# Patient Record
Sex: FEMALE | Race: BLACK OR AFRICAN AMERICAN | NOT HISPANIC OR LATINO | Employment: FULL TIME | URBAN - METROPOLITAN AREA
[De-identification: names, ages, dates, MRNs, and addresses within clinical notes are randomized per-mention and may not be internally consistent; named-entity substitution may affect disease eponyms.]

---

## 2017-01-03 ENCOUNTER — DOCUMENTATION ONLY (OUTPATIENT)
Dept: ORTHOPEDICS | Facility: CLINIC | Age: 47
End: 2017-01-03

## 2017-01-03 NOTE — PROGRESS NOTES
"Patient currently scheduled with FALGUNI Taveras. Appointment with Rosalia Youngblood notated with "shoulder fracture", per message. Appointment scheduled and message sent on the same day.  "

## 2017-01-06 ENCOUNTER — OFFICE VISIT (OUTPATIENT)
Dept: ORTHOPEDICS | Facility: CLINIC | Age: 47
End: 2017-01-06
Payer: COMMERCIAL

## 2017-01-06 ENCOUNTER — HOSPITAL ENCOUNTER (OUTPATIENT)
Dept: RADIOLOGY | Facility: HOSPITAL | Age: 47
Discharge: HOME | End: 2017-01-06
Attending: ORTHOPAEDIC SURGERY
Payer: COMMERCIAL

## 2017-01-06 VITALS
HEART RATE: 81 BPM | DIASTOLIC BLOOD PRESSURE: 82 MMHG | WEIGHT: 243.38 LBS | HEIGHT: 65 IN | BODY MASS INDEX: 40.55 KG/M2 | SYSTOLIC BLOOD PRESSURE: 120 MMHG

## 2017-01-06 DIAGNOSIS — M25.572 LEFT ANKLE PAIN, UNSPECIFIED CHRONICITY: ICD-10-CM

## 2017-01-06 DIAGNOSIS — M25.572 LEFT ANKLE PAIN, UNSPECIFIED CHRONICITY: Primary | ICD-10-CM

## 2017-01-06 PROCEDURE — 73610 X-RAY EXAM OF ANKLE: CPT | Mod: TC,LT

## 2017-01-06 PROCEDURE — 99203 OFFICE O/P NEW LOW 30 MIN: CPT | Mod: S$PBB,,, | Performed by: PHYSICIAN ASSISTANT

## 2017-01-06 PROCEDURE — 99999 PR PBB SHADOW E&M-EST. PATIENT-LVL III: CPT | Mod: PBBFAC,,, | Performed by: PHYSICIAN ASSISTANT

## 2017-01-06 PROCEDURE — 73610 X-RAY EXAM OF ANKLE: CPT | Mod: 26,LT,, | Performed by: RADIOLOGY

## 2017-01-06 PROCEDURE — 99213 OFFICE O/P EST LOW 20 MIN: CPT | Mod: PBBFAC | Performed by: PHYSICIAN ASSISTANT

## 2017-01-06 RX ORDER — MELOXICAM 15 MG/1
15 TABLET ORAL DAILY
Qty: 30 TABLET | Refills: 3 | Status: SHIPPED | OUTPATIENT
Start: 2017-01-06 | End: 2017-02-05

## 2017-01-06 NOTE — MR AVS SNAPSHOT
[facility]  [address]  [facility] [address]  Phone: [phone]                  Belkys Cote   2017 3:15 PM   Appointment    Description:  Female : 1970   Provider:  Rosalia Youngblood PA-C   Department:  [facility]                To Do List           Future Appointments        Provider Department Dept Phone    2017 3:15 PM Rosalia Youngblood PA-C [facility] [phone]      Goals (5 Years of Data)     None      [facility]     [facility] On Call Nurse Care Line -  Assistance  Registered nurses in the [facility] On Call Center provide clinical advisement, health education, appointment booking, and other advisory services.  Call for this free service at [phone].             Medications           Message regarding Medications     Verify the changes and/or additions to your medication regime listed below are the same as discussed with your clinician today.  If any of these changes or additions are incorrect, please notify your healthcare provider.             Verify that the below list of medications is an accurate representation of the medications you are currently taking.  If none reported, the list may be blank. If incorrect, please contact your healthcare provider. Carry this list with you in case of emergency.                Clinical Reference Information           Allergies as of 2017     Not on File      Immunizations Administered on Date of Encounter - 2017     None      [facility] Sign-Up     Activating your [facility] account is as easy as 1-2-3!     1) Visit [email or web address], select Sign Up Now, enter this activation code and your date of birth, then select Next.  [email or web address]  Expires: 2017  7:45 AM      2) Create a username and password to use when you visit [facility] in the future and select a security question in case you lose your password and select Next.    3) Enter your e-mail address and click Sign  Up!    Additional Information  If you have questions, please e-mail [email or web address] or call [phone] to talk to our [facility] staff. Remember, [facility] is NOT to be used for urgent needs. For medical emergencies, dial 911.

## 2017-01-06 NOTE — PROGRESS NOTES
Subjective:      Patient ID: Belkys Cote is a 46 y.o. female.    Chief Complaint: No chief complaint on file.    HPI  46 year old female presents with chief complaint of left ankle pain. She fractured her ankle in December 2015 and had ORIF in January 2016 by Dr. Alvarez at [place] in [place]. She reports intermittent pain since this injury and surgery. She did PT. Pain is diffuse around the ankle. It is worse with cold, rainy weather and in the mornings. She also has pain with walking. She doesn't take any medicine for the pain. She has intermittent swelling. She denies N/T. She wears a compression sleeve 75% of the time and she wears inserts.   Review of Systems   Constitution: Negative for chills, fever and night sweats.   Cardiovascular: Negative for chest pain.   Respiratory: Negative for cough and shortness of breath.    Hematologic/Lymphatic: Does not bruise/bleed easily.   Skin: Negative for color change.   Gastrointestinal: Negative for heartburn.   Genitourinary: Negative for dysuria.   Neurological: Negative for numbness and paresthesias.   Psychiatric/Behavioral: Negative for altered mental status.   Allergic/Immunologic: Negative for persistent infections.         Objective:            General    Vitals reviewed.  Constitutional: She is oriented to person, place, and time. She appears well-developed and well-nourished.   Cardiovascular: Normal rate.    Neurological: She is alert and oriented to person, place, and time.         Left Ankle/Foot Exam     Inspection  Erythema: absent    Range of Motion   The patient has normal left ankle ROM.     Muscle Strength   The patient has normal left ankle strength.    Tests   Anterior drawer: negative  Varus tilt: negative  Single Heel Rise: unable to perform    Other   Sensation: normal    Comments:  Pes planus. TTP medial ankle and anterior joint line. No lateral tenderness.       Vascular Exam       Left Pulses  Dorsalis Pedis:      2+              X-ray:  ordered and reviewed by myself. Skeletal structures are intact without acute fracture. Fixation plate is present at the distal fibula presumably for an old fracture that has healed. Alignment and joint spaces are satisfactory of the ankle mortise without significant arthritis. No focal soft tissue swelling is seen.         Assessment:       Encounter Diagnosis   Name Primary?    Left ankle pain, unspecified chronicity Yes          Plan:       Patient was placed in a boot. Prescription for mobic sent to pharmacy. Continue inserts. RTC in 4 weeks for re-evaluation.

## 2017-02-03 ENCOUNTER — OFFICE VISIT (OUTPATIENT)
Dept: ORTHOPEDICS | Facility: CLINIC | Age: 47
End: 2017-02-03
Payer: COMMERCIAL

## 2017-02-03 VITALS — SYSTOLIC BLOOD PRESSURE: 113 MMHG | HEART RATE: 81 BPM | DIASTOLIC BLOOD PRESSURE: 82 MMHG

## 2017-02-03 DIAGNOSIS — M25.572 CHRONIC PAIN OF LEFT ANKLE: Primary | ICD-10-CM

## 2017-02-03 DIAGNOSIS — G89.29 CHRONIC PAIN OF LEFT ANKLE: Primary | ICD-10-CM

## 2017-02-03 PROCEDURE — 99213 OFFICE O/P EST LOW 20 MIN: CPT | Mod: PBBFAC | Performed by: PHYSICIAN ASSISTANT

## 2017-02-03 PROCEDURE — 99999 PR PBB SHADOW E&M-EST. PATIENT-LVL III: CPT | Mod: PBBFAC,,, | Performed by: PHYSICIAN ASSISTANT

## 2017-02-03 PROCEDURE — 99213 OFFICE O/P EST LOW 20 MIN: CPT | Mod: S$PBB,,, | Performed by: PHYSICIAN ASSISTANT

## 2017-02-03 NOTE — MR AVS SNAPSHOT
[facility]  [address]  [facility] [address]  Phone: [phone]                  Belkys Cote   2/3/2017 10:00 AM   Appointment    Description:  Female : 1970   Provider:  Rosalia Youngblood PA-C   Department:  [facility]                To Do List           Future Appointments        Provider Department Dept Phone    2/3/2017 10:00 AM Rosalia Youngblood PA-C [facility] [phone]      Goals (5 Years of Data)     None      [facility]     [facility] On [facility] Nurse Care Line -  Assistance  Registered nurses in the [facility] On Call Center provide clinical advisement, health education, appointment booking, and other advisory services.  Call for this free service at [phone].             Medications           Message regarding Medications     Verify the changes and/or additions to your medication regime listed below are the same as discussed with your clinician today.  If any of these changes or additions are incorrect, please notify your healthcare provider.             Verify that the below list of medications is an accurate representation of the medications you are currently taking.  If none reported, the list may be blank. If incorrect, please contact your healthcare provider. Carry this list with you in case of emergency.           Current Medications     meloxicam (MOBIC) 15 MG tablet Take 1 tablet (15 mg total) by mouth once daily.           Clinical Reference Information           Allergies as of 2/3/2017     Betadine [Povidone-iodine]    Chlorhexidine      Immunizations Administered on Date of Encounter - 2/3/2017     None      Language Assistance Services     ATTENTION: Language assistance services are available, free of charge. Please call [phone].      ATENCIÓN: Si habla christinaañol, tiene a farris disposición servicios gratuitos de asistencia lingüística. Llame al [phone].     CHÚ Ý: N?u b?n nói Ti?ng Vi?t, có các d?ch v? h?  tr? roman diego? mi?n phí dành cho b?n. G?i s? [phone].         [facility] complies with applicable Federal civil rights laws and does not discriminate on the basis of race, color, national origin, age, disability, or sex.

## 2017-02-03 NOTE — PROGRESS NOTES
Subjective:      Patient ID: Belkys Cote is a 46 y.o. female.    Chief Complaint: No chief complaint on file.    HPI  Patient returns to clinic for f/u of her chronic left ankle pain. She has been WBAT in the boot with inserts for the past 4 weeks. She reports 6/10 pain. She says it hasn't gotten better since last visit. Pain is at the medial ankle. It hurts more without the boot. Mobic does not help.   Review of Systems   Constitution: Negative for chills, fever and night sweats.   Cardiovascular: Negative for chest pain.   Respiratory: Negative for cough and shortness of breath.    Hematologic/Lymphatic: Does not bruise/bleed easily.   Skin: Negative for color change.   Gastrointestinal: Negative for heartburn.   Genitourinary: Negative for dysuria.   Neurological: Negative for numbness and paresthesias.   Psychiatric/Behavioral: Negative for altered mental status.   Allergic/Immunologic: Negative for persistent infections.         Objective:            General    Vitals reviewed.  Constitutional: She is oriented to person, place, and time. She appears well-developed and well-nourished.   Cardiovascular: Normal rate.    Neurological: She is alert and oriented to person, place, and time.         Left Ankle/Foot Exam     Inspection  Erythema: absent    Range of Motion   The patient has normal left ankle ROM.     Tests   Single Heel Rise: unable to perform    Other   Sensation: normal    Comments:  TTP PTT and medial talar dome.       Vascular Exam       Left Pulses  Dorsalis Pedis:      2+                      Assessment:       Encounter Diagnosis   Name Primary?    Chronic pain of left ankle Yes          Plan:       Ankle pain has not improved since last visit. MRI was ordered with metal reduction. She will f/u with Dr. Spivey after testing is done.

## 2017-02-13 ENCOUNTER — HOSPITAL ENCOUNTER (OUTPATIENT)
Dept: RADIOLOGY | Facility: HOSPITAL | Age: 47
Discharge: HOME | End: 2017-02-13
Attending: ORTHOPAEDIC SURGERY
Payer: COMMERCIAL

## 2017-02-13 DIAGNOSIS — G89.29 CHRONIC PAIN OF LEFT ANKLE: ICD-10-CM

## 2017-02-13 DIAGNOSIS — M25.572 CHRONIC PAIN OF LEFT ANKLE: ICD-10-CM

## 2017-02-13 PROCEDURE — 73721 MRI JNT OF LWR EXTRE W/O DYE: CPT | Mod: 26,LT,, | Performed by: RADIOLOGY

## 2017-02-13 PROCEDURE — 73721 MRI JNT OF LWR EXTRE W/O DYE: CPT | Mod: TC,LT

## 2017-02-20 ENCOUNTER — OFFICE VISIT (OUTPATIENT)
Dept: ORTHOPEDICS | Facility: CLINIC | Age: 47
End: 2017-02-20
Payer: COMMERCIAL

## 2017-02-20 VITALS
DIASTOLIC BLOOD PRESSURE: 72 MMHG | HEIGHT: 65 IN | BODY MASS INDEX: 39.67 KG/M2 | SYSTOLIC BLOOD PRESSURE: 105 MMHG | WEIGHT: 238.13 LBS | HEART RATE: 78 BPM

## 2017-02-20 DIAGNOSIS — M72.2 PLANTAR FASCIITIS: ICD-10-CM

## 2017-02-20 DIAGNOSIS — M76.829 POSTERIOR TIBIAL TENDON DYSFUNCTION: ICD-10-CM

## 2017-02-20 DIAGNOSIS — M19.072 ARTHROSIS OF ANKLE, LEFT: Primary | ICD-10-CM

## 2017-02-20 PROCEDURE — 99213 OFFICE O/P EST LOW 20 MIN: CPT | Mod: S$PBB,,, | Performed by: ORTHOPAEDIC SURGERY

## 2017-02-20 PROCEDURE — 99999 PR PBB SHADOW E&M-EST. PATIENT-LVL III: CPT | Mod: PBBFAC,,, | Performed by: ORTHOPAEDIC SURGERY

## 2017-02-20 PROCEDURE — 99213 OFFICE O/P EST LOW 20 MIN: CPT | Mod: PBBFAC | Performed by: ORTHOPAEDIC SURGERY

## 2017-02-20 RX ORDER — TOPIRAMATE 25 MG/1
TABLET ORAL
COMMUNITY
End: 2017-02-20 | Stop reason: CLARIF

## 2017-02-20 RX ORDER — FEXOFENADINE HCL AND PSEUDOEPHEDRINE HCI 180; 240 MG/1; MG/1
1 TABLET, EXTENDED RELEASE ORAL DAILY
COMMUNITY

## 2017-02-20 RX ORDER — HYDROCODONE BITARTRATE AND ACETAMINOPHEN 5; 325 MG/1; MG/1
TABLET ORAL
COMMUNITY
Start: 2016-01-28

## 2017-02-20 RX ORDER — NAPROXEN SODIUM 220 MG/1
TABLET, FILM COATED ORAL
COMMUNITY

## 2017-02-20 RX ORDER — TOPIRAMATE 100 MG/1
25 TABLET, FILM COATED ORAL 2 TIMES DAILY
COMMUNITY

## 2017-02-20 RX ORDER — ONDANSETRON 4 MG/1
8 TABLET, ORALLY DISINTEGRATING ORAL
COMMUNITY

## 2017-02-20 NOTE — PROGRESS NOTES
DATE: 2/20/2017  PATIENT: Belkys Cote    CHIEF COMPLAINT: left foot pain    HISTORY:  Belkys Cote is a 46 y.o. female here for initial evaluation of her left foot pain.  She has h/o bilateral flat feet (which she feels have progressed), left plantar fasciitis, and left fibula fx s/p ORIF (12/2015 in [place], [place]); the PTTI and plantar fasciitis have been painful since prior to the fx.  Recently she has been having plantar pains in her left foot that increase with weight bearing and is most severe with first few steps in the morning.  She has tried boot immobilization and NSAIDs with nvumlb-fa-ad relief.  She was supposed to receive a plantar fascia night splint prior to leaving [place], however this was not obtained.  She also has bl flat feet, which she feels have gotten worse.      She has not lateral ankle pain or feelings of instability      PAST MEDICAL/SURGICAL HISTORY:  Past Medical History   Diagnosis Date    Allergy     Anxiety      Past Surgical History   Procedure Laterality Date    Cholecystectomy  01/2001    Hysterectomy  01/2000    Neck sugery  01/2008    Ankle sugery   01/2016       Current Medications:   Current Outpatient Prescriptions:     aspirin 81 MG Chew, Take 81 mg by mouth daily, Disp: , Rfl:     fexofenadine-pseudoephedrine (ALLEGRA-D 24) 180-240 mg per 24 hr tablet, Take 1 tablet by mouth once daily., Disp: , Rfl:     hydrocodone-acetaminophen 5-325mg (NORCO) 5-325 mg per tablet, Take 1-2 tablets by mouth every 6 (six) hours as needed  for Pain, Disp: , Rfl:     ondansetron (ZOFRAN-ODT) 4 MG TbDL, Take 8 mg by mouth every 12 (twelve) hours., Disp: , Rfl:     topiramate (TOPAMAX) 100 MG tablet, Take 100 mg by mouth 2 (two) times daily., Disp: , Rfl:     Social History:   Social History     Social History    Marital status: Unknown     Spouse name: N/A    Number of children: N/A    Years of education: N/A     Occupational History    Not on file.     Social History Main  "Topics    Smoking status: Never Smoker    Smokeless tobacco: Not on file    Alcohol use Yes    Drug use: No    Sexual activity: Not on file     Other Topics Concern    Not on file     Social History Narrative       REVIEW OF SYSTEMS:  Constitution: Negative. Negative for chills, fever and night sweats.   Cardiovascular: Negative for chest pain and syncope.   Respiratory: Negative for cough and shortness of breath.   Gastrointestinal: See HPI. Negative for nausea/vomiting. Negative for abdominal pain.  Genitourinary: See HPI. Negative for discoloration or dysuria.  Skin: Negative for dry skin, itching and rash.   Hematologic/Lymphatic: Negative for bleeding problem. Does not bruise/bleed easily.   Musculoskeletal: Negative for falls and muscle weakness.   Neurological: See HPI. No seizures.   Endocrine: Negative for polydipsia, polyphagia and polyuria.   Allergic/Immunologic: Negative for hives and persistent infections.    PHYSICAL EXAMINATION:    Visit Vitals    /72 (BP Location: Right arm, Patient Position: Sitting, BP Method: Automatic)    Pulse 78    Ht 5' 5" (1.651 m)    Wt 108 kg (238 lb 1.6 oz)    BMI 39.62 kg/m2       General: The patient is a 46 y.o. female in no apparent distress, the patient is orientatied to person, place and time.   Psych: Normal mood and affect  HEENT:  NCAT, sclera nonicteric  Lungs:  Respirations are equal and unlabored.  CV:  2+ bilateral upper and lower extremity pulses.  Skin:  Intact throughout.  Musculoskeletal: No pain with the range of motion of the bilateral hips. No trochanteric tenderness to palpation. No pain with range of motion about the bilateral knees.    Left Ankle/Foot:  - pes planus with slight valgus  - mild ttp to PT, medial ankle joint line, and plantar fascia  - full ROM without pain  - unable to perform single leg heel raise  - SILT  - 2+ DP/PT pulse        IMAGING:     Radiographs of the left ankle were personally reviewed with the patient " today.   - pes planus    MRI   - medical talar cartilage defect   - to inflammation of plantar fascia or PTT    ASSESSMENT/PLAN:    Belkys was seen today for foot pain.    Diagnoses and all orders for this visit:    Arthrosis of ankle, left    Plantar fasciitis    Posterior tibial tendon dysfunction      No Follow-up on file.    PTTI with sx of chronic plantar fasciitis.  No MRI evidence of PTT injury or plantar fasciitis.  Recommend continuing conservative management, as there does not appear to be anything to repair surgically.    I have personally taken the history and examined this patient and agree with the residents note as stated above.  Clinically appears to have dysfunction of the plantar fascia and posterior tibial tendon with progressive flattening of the longitudinal arch.  MRI does not suggest any acute structural pathology of the plantar fascia and posterior tibial tendon.   Medial talar OCD may be causing some symptoms and I discussed possible arthroscopy.  She would require Stage 2 Posterior tibal tendon dysfunction procedure if she can't live with current symptoms.  I do recommend continued orthotics to support longitudinal arch.

## 2017-02-20 NOTE — LETTER
February 21, 2017      Rosalia Youngblood PA-C  [address] [facility] [address]           [facility]  [address]  [place] [address]  Phone: [phone]          Patient: Belkys Cote   MR Number: [id number]   YOB: 1970   Date of Visit: 2/20/2017       Dear Rosalia Youngblood:    Thank you for referring Belkys Cote to me for evaluation. Attached you will find relevant portions of my assessment and plan of care.    If you have questions, please do not hesitate to call me. I look forward to following Belkys Cote along with you.    Sincerely,    Kale Spivey MD    Enclosure  CC:  No Recipients    If you would like to receive this communication electronically, please contact [email or web address] or [phone] to request more information on [employer] Link access.    For providers and/or their staff who would like to refer a patient to [facility], please contact us through our one-stop-shop provider referral line, [facility], at [phone].    If you feel you have received this communication in error or would no longer like to receive these types of communications, please e-mail [email or web address]

## 2017-04-04 ENCOUNTER — PATIENT MESSAGE (OUTPATIENT)
Dept: ORTHOPEDICS | Facility: CLINIC | Age: 47
End: 2017-04-04

## 2018-03-27 ENCOUNTER — TELEPHONE (OUTPATIENT)
Dept: NEUROLOGY | Facility: HOSPITAL | Age: 48
End: 2018-03-27

## 2018-03-27 DIAGNOSIS — D3A.026 RECTAL CARCINOID TUMOR: Primary | ICD-10-CM

## 2018-03-27 NOTE — TELEPHONE ENCOUNTER
New NET pt with rectal carcinoid, ref by Dr. Osuna.  Ordered Ga 68 PET/CT, CT, MRI, echocardiogram, tumor markers and path re read per protocol.  Appointment made with MD, info sent to patient.

## 2018-03-27 NOTE — TELEPHONE ENCOUNTER
----- Message from Mallorie BULL Anrogerio sent at 3/26/2018  1:23 PM CDT -----  Contact: Patient  Who Referred:  Dr. Elian Osuna    Which Doctor requesting:  Dr. Bloom    What do you need to be seen for:  EUS was done by Dr. Osuna, path was done and confirmed to be neuroendocrine.  Dr. Osuna is sending over referral and history.      Patient can be reached at [phone].  Thank you  abc

## 2018-03-27 NOTE — LETTER
March 27, 2018        Elian Osuna MD  [address] [place] [address] Blvd  [address]  [facility]  [place] [address]             [facility] [address]  [place] [address]  Phone: [phone]  Fax: [phone]   Patient: Belkys Cote   MR Number: [id number]   YOB: 1970   Date of Visit: 3/27/2018     Dear Dr. Osuna,     We contacted Ms. Cote regarding setting up an appointment for an evaluation at our center.  We scheduled a CT scan, MRI, Ga 68 PET/CT scan, echocardiogram, blood tumor markers and a pathology re read over the next couple of weeks.  We also scheduled an appointment with Dr. Christopher Bloom on 4/18/18 for recommendations.  We will forward you a copy of the clinic note after the visit.      Thank you for considering our program and for referring this patient.  If you have any questions, please do not hesitate to contact us.      Sincerely,      Codie Henderson, RN, BSN, ONN-CG  Nurse Navigator - Neuroendocrine Tumor Program

## 2018-03-28 LAB
EXT 24 HR UR METANEPHRINE: NORMAL
EXT 24 HR UR NORMETANEPHRINE: NORMAL
EXT 24 HR UR NORMETANEPHRINE: NORMAL
EXT 25 HYDROXY VIT D2: NORMAL
EXT 25 HYDROXY VIT D3: NORMAL
EXT 5 HIAA 24 HR URINE: NORMAL
EXT 5 HIAA BLOOD: 14 NG/ML (ref 0–22)
EXT ACTH: NORMAL
EXT AFP: NORMAL
EXT ALBUMIN: 4.3 G/DL (ref 3.6–5.1)
EXT ALKALINE PHOSPHATASE: 89 U/L (ref 33–115)
EXT ALT: 22 U/L (ref 6–29)
EXT AMYLASE: NORMAL
EXT ANTI ISLET CELL AB: NORMAL
EXT ANTI PARIETAL CELL AB: NORMAL
EXT ANTI THYROID AB: NORMAL
EXT AST: 22 U/L (ref 10–35)
EXT BILIRUBIN DIRECT: NORMAL
EXT BILIRUBIN TOTAL: 0.3 MG/DL (ref 0.2–1.2)
EXT BK VIRUS DNA QN PCR: NORMAL
EXT BUN: 10 MG/DL (ref 7–25)
EXT C PEPTIDE: NORMAL
EXT CA 125: NORMAL
EXT CA 19-9: NORMAL
EXT CA 27-29: NORMAL
EXT CALCITONIN: NORMAL
EXT CALCIUM: 10 MG/DL (ref 8.6–10.2)
EXT CEA: NORMAL
EXT CHLORIDE: 104 MMOL/L (ref 98–110)
EXT CHOLESTEROL: NORMAL
EXT CHROMOGRANIN A: NORMAL
EXT CO2: 26 MMOL/L (ref 20–31)
EXT CREATININE UA: NORMAL
EXT CREATININE: 0.73 MG/DL (ref 0.5–1.1)
EXT CYCLOSPORONE LEVEL: NORMAL
EXT DOPAMINE: NORMAL
EXT EBV DNA BY PCR: NORMAL
EXT EPINEPHRINE: NORMAL
EXT FOLATE: NORMAL
EXT FREE T3: NORMAL
EXT FREE T4: NORMAL
EXT FSH: NORMAL
EXT GASTRIN RELEASING PEPTIDE: NORMAL
EXT GASTRIN RELEASING PEPTIDE: NORMAL
EXT GASTRIN: NORMAL
EXT GGT: NORMAL
EXT GHRELIN: NORMAL
EXT GLUCAGON: NORMAL
EXT GLUCOSE: 87 MG/DL (ref 65–99)
EXT GROWTH HORMONE: NORMAL
EXT HCV RNA QUANT PCR: NORMAL
EXT HDL: NORMAL
EXT HEMATOCRIT: 40.6 % (ref 35–45)
EXT HEMOGLOBIN A1C: NORMAL
EXT HEMOGLOBIN: 13.2 G/DL (ref 11.7–15.5)
EXT HISTAMINE 24 HR URINE: NORMAL
EXT HISTAMINE: NORMAL
EXT IGF-1: NORMAL
EXT IMMUNKNOW (NON-STIMULATED): NORMAL
EXT IMMUNKNOW (STIMULATED): NORMAL
EXT INR: NORMAL
EXT INSULIN: NORMAL
EXT LANREOTIDE LEVEL: NORMAL
EXT LDH, TOTAL: NORMAL
EXT LDL CHOLESTEROL: NORMAL
EXT LIPASE: NORMAL
EXT MAGNESIUM: NORMAL
EXT METANEPHRINE FREE PLASMA: NORMAL
EXT MOTILIN: NORMAL
EXT NEUROKININ A CAMB: NORMAL
EXT NEUROKININ A ISI: NORMAL
EXT NEUROTENSIN: NORMAL
EXT NOREPINEPHRINE: NORMAL
EXT NORMETANEPHRINE: NORMAL
EXT NSE: NORMAL
EXT OCTREOTIDE LEVEL: NORMAL
EXT PANCREASTATIN CAMB: NORMAL
EXT PANCREASTATIN ISI: 74 PG/ML (ref 10–135)
EXT PANCREATIC POLYPEPTIDE: NORMAL
EXT PHOSPHORUS: NORMAL
EXT PLATELETS: 293 1000/UL (ref 140–400)
EXT POTASSIUM: 4 MMOL/L (ref 3.5–5.3)
EXT PROGRAF LEVEL: NORMAL
EXT PROLACTIN: NORMAL
EXT PROTEIN TOTAL: 7.4 G/DL (ref 6.1–8.1)
EXT PROTEIN UA: NORMAL
EXT PT: NORMAL
EXT PTH, INTACT: NORMAL
EXT PTT: NORMAL
EXT RAPAMUNE LEVEL: NORMAL
EXT SEROTONIN: 224 NG/ML (ref 56–244)
EXT SODIUM: 139 MMOL/L (ref 135–146)
EXT SOMATOSTATIN: NORMAL
EXT SUBSTANCE P: NORMAL
EXT TRIGLYCERIDES: NORMAL
EXT TRYPTASE: NORMAL
EXT TSH: NORMAL
EXT URIC ACID: NORMAL
EXT URINE AMYLASE U/HR: NORMAL
EXT URINE AMYLASE U/L: NORMAL
EXT VASOACTIVE INTESTINAL POLYPEPTIDE: NORMAL
EXT VITAMIN B12: NORMAL
EXT VMA 24 HR URINE: NORMAL
EXT WBC: 7.2 1000/UL (ref 3.8–10.8)
NEURON SPECIFIC ENOLASE: 5 NG/ML (ref 0–10.8)

## 2018-03-29 ENCOUNTER — TELEPHONE (OUTPATIENT)
Dept: NEUROLOGY | Facility: HOSPITAL | Age: 48
End: 2018-03-29

## 2018-03-29 NOTE — TELEPHONE ENCOUNTER
----- Message from Mallorie Johnson sent at 3/28/2018  2:06 PM CDT -----  Contact: Patient  EAW/NEW:  The patient called and stated she had tests sched for 4/46/18 but she is chlostrophobic.  She is requesting a mild sedative be sent to her pharmacy, [facility] [address] [facility] [address].  Ms Cote can be reached at [phone].  Thank you  abc

## 2018-03-29 NOTE — TELEPHONE ENCOUNTER
Spoke with pt, recommended to take OTC benadryl or reach out to primary care md for rx.  She verbalized understanding.

## 2018-04-09 ENCOUNTER — LAB VISIT (OUTPATIENT)
Dept: LAB | Facility: HOSPITAL | Age: 48
End: 2018-04-09
Attending: SURGERY
Payer: COMMERCIAL

## 2018-04-09 DIAGNOSIS — D3A.026 RECTAL CARCINOID TUMOR: ICD-10-CM

## 2018-04-09 PROCEDURE — 88360 TUMOR IMMUNOHISTOCHEM/MANUAL: CPT | Mod: 26,59,, | Performed by: PATHOLOGY

## 2018-04-09 PROCEDURE — 88321 CONSLTJ&REPRT SLD PREP ELSWR: CPT | Mod: ,,, | Performed by: PATHOLOGY

## 2018-04-09 PROCEDURE — 88360 TUMOR IMMUNOHISTOCHEM/MANUAL: CPT | Mod: 59 | Performed by: PATHOLOGY

## 2018-04-13 ENCOUNTER — HOSPITAL ENCOUNTER (OUTPATIENT)
Dept: RADIOLOGY | Facility: HOSPITAL | Age: 48
Discharge: HOME | End: 2018-04-13
Attending: SURGERY
Payer: COMMERCIAL

## 2018-04-13 DIAGNOSIS — D3A.026 RECTAL CARCINOID TUMOR: ICD-10-CM

## 2018-04-13 PROCEDURE — 78815 PET IMAGE W/CT SKULL-THIGH: CPT | Mod: TC

## 2018-04-13 PROCEDURE — A9587 GALLIUM GA-68: HCPCS | Mod: TB

## 2018-04-13 PROCEDURE — 78815 PET IMAGE W/CT SKULL-THIGH: CPT | Mod: 26,PI,, | Performed by: RADIOLOGY

## 2018-04-16 ENCOUNTER — HOSPITAL ENCOUNTER (OUTPATIENT)
Dept: CARDIOLOGY | Facility: HOSPITAL | Age: 48
Discharge: HOME | End: 2018-04-16
Attending: SURGERY
Payer: OTHER GOVERNMENT

## 2018-04-16 ENCOUNTER — HOSPITAL ENCOUNTER (OUTPATIENT)
Dept: RADIOLOGY | Facility: HOSPITAL | Age: 48
Discharge: HOME | End: 2018-04-16
Attending: SURGERY
Payer: OTHER GOVERNMENT

## 2018-04-16 ENCOUNTER — HOSPITAL ENCOUNTER (OUTPATIENT)
Dept: RADIOLOGY | Facility: HOSPITAL | Age: 48
Discharge: HOME | End: 2018-04-16
Attending: SURGERY
Payer: COMMERCIAL

## 2018-04-16 DIAGNOSIS — D3A.026 RECTAL CARCINOID TUMOR: ICD-10-CM

## 2018-04-16 LAB
DIASTOLIC DYSFUNCTION: NO
MITRAL VALVE MOBILITY: NORMAL
RETIRED EF AND QEF - SEE NOTES: 60 (ref 55–65)

## 2018-04-16 PROCEDURE — 71270 CT THORAX DX C-/C+: CPT | Mod: TC

## 2018-04-16 PROCEDURE — 93306 TTE W/DOPPLER COMPLETE: CPT | Mod: 26,,, | Performed by: INTERNAL MEDICINE

## 2018-04-16 PROCEDURE — 74183 MRI ABD W/O CNTR FLWD CNTR: CPT | Mod: TC

## 2018-04-16 PROCEDURE — 74178 CT ABD&PLV WO CNTR FLWD CNTR: CPT | Mod: TC

## 2018-04-16 PROCEDURE — 74183 MRI ABD W/O CNTR FLWD CNTR: CPT | Mod: 26,,, | Performed by: RADIOLOGY

## 2018-04-16 PROCEDURE — 25500020 PHARM REV CODE 255: Performed by: SURGERY

## 2018-04-16 PROCEDURE — 71270 CT THORAX DX C-/C+: CPT | Mod: 26,,, | Performed by: RADIOLOGY

## 2018-04-16 PROCEDURE — 93306 TTE W/DOPPLER COMPLETE: CPT

## 2018-04-16 PROCEDURE — 74178 CT ABD&PLV WO CNTR FLWD CNTR: CPT | Mod: 26,,, | Performed by: RADIOLOGY

## 2018-04-16 PROCEDURE — A9585 GADOBUTROL INJECTION: HCPCS | Performed by: SURGERY

## 2018-04-16 RX ORDER — GADOBUTROL 604.72 MG/ML
10 INJECTION INTRAVENOUS
Status: COMPLETED | OUTPATIENT
Start: 2018-04-16 | End: 2018-04-16

## 2018-04-16 RX ADMIN — GADOBUTROL 10 ML: 604.72 INJECTION INTRAVENOUS at 12:04

## 2018-04-16 RX ADMIN — IOHEXOL 30 ML: 350 INJECTION, SOLUTION INTRAVENOUS at 11:04

## 2018-04-16 RX ADMIN — IOHEXOL 100 ML: 350 INJECTION, SOLUTION INTRAVENOUS at 12:04

## 2018-04-18 ENCOUNTER — OFFICE VISIT (OUTPATIENT)
Dept: NEUROLOGY | Facility: HOSPITAL | Age: 48
End: 2018-04-18
Attending: SURGERY
Payer: COMMERCIAL

## 2018-04-18 VITALS
SYSTOLIC BLOOD PRESSURE: 116 MMHG | TEMPERATURE: 99 F | WEIGHT: 231.56 LBS | BODY MASS INDEX: 38.58 KG/M2 | HEART RATE: 77 BPM | DIASTOLIC BLOOD PRESSURE: 83 MMHG | HEIGHT: 65 IN

## 2018-04-18 DIAGNOSIS — C7A.026 MALIGNANT CARCINOID TUMOR OF THE RECTUM: ICD-10-CM

## 2018-04-18 DIAGNOSIS — D49.89 NEOPLASM OF ABDOMEN: ICD-10-CM

## 2018-04-18 PROCEDURE — 99214 OFFICE O/P EST MOD 30 MIN: CPT | Performed by: SURGERY

## 2018-04-18 RX ORDER — MONTELUKAST SODIUM 10 MG/1
10 TABLET ORAL DAILY
COMMUNITY

## 2018-04-18 RX ORDER — FLUTICASONE PROPIONATE 50 MCG
1 SPRAY, SUSPENSION (ML) NASAL DAILY
COMMUNITY

## 2018-04-18 NOTE — PATIENT INSTRUCTIONS
Blood work / Lab work / Tumor markers every 3 mos.  Get lab work drawn at least 1 month prior to appointment. --- due June 2018 and September 2018    Scans:  CT Abd/Pelvis  in 6 mos.   --- due October 2018  Call Scheduling Department at [phone] to schedule scans prior to next appointment:      Return clinic appointment in 6 months with Dr. Bloom - appointment made    Alternate EUS and Flex Sig: rotate every 6 months --- EUS will be due in September 2018  Get with Dr Osuna to have them redo another EUS before your next appointment.     Previous Records: Work on getting your prior CT Scan from when you had kidney stones that way it can be used to compare T10. You will need to obtain the report and CT Images (cd) and have these sent to our office for comparison.

## 2018-04-18 NOTE — LETTER
April 18, 2018        [facility]  [address] [facility] [address]       [facility]AN[facility]: [facility]  A collaboration between [facility] [address]  [address]  [place] [address]  Phone: [phone]  Fax: [phone]        MAIA Daugherty M.D., FACS  Jose Delgadillo M.D.  Kasi Rboerts M.D.   Ade Ramsey M.D., FACS  DO Christopher Siddiqi M.D., FACS   Patient: Belkys Cote   MR Number: [id number]   YOB: 1970   Date of Visit: 4/18/2018     Dear Dr. Christiansen    Thank you for the kind referral of Belkys Cote. We saw this patient on 4/18/2018, and a copy of our clinic note is enclosed. We certainly appreciate the opportunity to participate in your patient's care.     If you have any questions or wish to discuss your patient further, please do not hesitate to contact us at [phone].       Kindest personal regards,          Christopher Bloom MD, FACS  [employer] Professor of Surgery & Neurosciences  [facility], Dept. Of Surgery  [address], [address]  [place] [address] [phone]

## 2018-04-18 NOTE — PROGRESS NOTES
"NOLANETS:  [facility]  A collaboration between [facility] and [facility]    PATIENT: Belkys Cote  MRN: [id number]  DATE: 4/18/2018    Vitals:    04/18/18 0820   BP: 116/83   Pulse: 77   Temp: 98.7 °F (37.1 °C)   TempSrc: Oral   Weight: 105 kg (231 lb 9.5 oz)   Height: 5' 5" (1.651 m)      Last 2 Weight Measurements:   Wt Readings from Last 2 Encounters:   04/18/18 105 kg (231 lb 9.5 oz)   02/20/17 108 kg (238 lb 1.6 oz)     BMI: Body mass index is 38.54 kg/m².    Karnofsky Score    Karnofsky Score:  90% - Able to Carry on Normal Activity: Minor Symptoms of Disease       Diagnosis:   1. Malignant carcinoid tumor of the rectum      Interval History: Ms. Rocael ajin to evaluate and treat a rectal NET --ki67 is 0%    Past Medical History:   Diagnosis Date    Allergy     Anxiety     Rectal carcinoid tumor 01/2018       Past Surgical History:   Procedure Laterality Date    ankle sugery   01/2016    CHOLECYSTECTOMY  01/2001    HYSTERECTOMY  01/2000    neck sugery  01/2008       Review of patient's allergies indicates:   Allergen Reactions    Betadine [povidone-iodine] Rash    Chlorhexidine Hives    Epinephrine      Neuroendocrine Tumor patient           Current Outpatient Prescriptions   Medication Sig Dispense Refill    aspirin 81 MG Chew Take 81 mg by mouth daily      fexofenadine-pseudoephedrine (ALLEGRA-D 24) 180-240 mg per 24 hr tablet Take 1 tablet by mouth once daily.      fluticasone (FLONASE) 50 mcg/actuation nasal spray 1 spray by Nasal route once daily.       hydrocodone-acetaminophen 5-325mg (NORCO) 5-325 mg per tablet Take 1-2 tablets by mouth every 6 (six) hours as needed  for Pain      montelukast (SINGULAIR) 10 mg tablet Take 10 mg by mouth once daily.       ondansetron (ZOFRAN-ODT) 4 MG TbDL Take 8 mg by mouth every 12 (twelve) hours.      topiramate (TOPAMAX) 100 MG tablet Take 25 mg by " mouth 2 (two) times daily.        No current facility-administered medications for this visit.        Review of Systems     Number of Days per Week Number per Day   DIARRHEA 5 2   BRISTOL STOOL SCALE RATING Type 5 and type 6    FLUSHING 7 1--10-15 minutes   WHEEZING 0      WEIGHT GAIN/LOSS Stable 231 today   ENERGY RATING (0-10) 10        Physical Exam: deferred.       Pathology Data:        Laboratory Data:  Neuroendocrine Labs Latest Ref Rng & Units 3/28/2018   EXT 5 HIAA BLOOD 0 - 22 ng/ml 14   EXT SEROTONIN 56 - 244 ng/ml 224   EXT PANCREASTATIN ATUL 10 - 135 pg/ml 74   EXT WBC 3.8 - 10.8 1000/ul 7.2   EXT HGB 11.7 - 15.5 g/dl 13.2   EXT HCT 35.0 - 45.0 % 40.6   EXT PLATLETS 140 - 400 1000/ul 293   EXT GLUCOSE 65 - 99 mg/dl 87   EXT BUN 7 - 25 mg/dl 10   EXT CREATININE 0.50 - 1.10 mg/dl 0.73   EXT  - 146 mmol/l 139   EXT K 3.5 - 5.3 mmol/l 4.0   EXT CHLORIDE 98 - 110 mmol/l 104   EXT CO2 20 - 31 mmol/l 26   EXT CALCIUM 8.6 - 10.2 mg/dl 10.0   EXT PROTEIN, TOTAL 6.1 - 8.1 g/dl 7.4   EXT ALBUMIN 3.6 - 5.1 g/dl 4.3   EXT TOTAL BILIRUBIN 0.2 - 1.2 mg/dl 0.3   EXT ALK PHOSPHATASE 33 - 115 u/l 89   EXT SGOT (AST) 10 - 35 u/l 22   EXT ALT 6 - 29 u/l 22   NSE 0 - 10.8 ng/ml 5.0   Weight           Radiology Data:  FINDINGS:  Chest: Lungs are clear.  There are no areas of lung consolidation.  There is no pleural effusion or pneumothorax.  No hilar mediastinal mass or adenopathy.  Few scattered nonenlarged axillary lymph nodes.    Abdomen/pelvis: Surgical clips gallbladder fossa compatible prior cholecystectomy.  The spleen and pancreas are normal in size without focal lesion.  There is a subcentimeter nonobstructive stone within the left upper pole renal calyx measuring approximately 2 mm.  There is excreting contrast in the renal pelvis and calyces bilaterally.  No hydronephrosis.  The kidneys are normal in size bilaterally.    The adrenal glands are within normal limits.  There is no aneurysmal dilatation of the  abdominal aorta.    No signs of appendicitis with normal appendix identified.    No free intraperitoneal gas or fluid collection.  No focal pelvic mass, fluid collection or adenopathy.    Duplicated IVC identified.  Nonspecific sclerotic focus within the T10 vertebral body corresponds to abnormality seen on PET-CT.  This measures approximately 2.0 by 0.9 cm on image 12 series 4..  In addition there is punctate sclerosis within the T12 vertebral body.    No evidence for acute fracture or subluxation of the visualized spine.   Impression       Nonspecific sclerosis in the left posterior T10 vertebral body and punctate focus in T12 vertebral body corresponds to region identified on PET-CT.  These may be sequela of prior treated metastases.    No evidence for enhancing lesion throughout the chest abdomen and pelvis to suggest metastatic disease.     EXAMINATION:  MRI ABDOMEN W WO CONTRAST    CLINICAL HISTORY:  rectal carcinoid;  Benign carcinoid tumor of the rectum    TECHNIQUE:  Coronal and axial T2, axial T2 Fiesta, axial T2 fat sat, axial DWI, and T1 in and out of phase as well as postcontrast T1 axial with dynamic fat-sat postcontrast T1 imaging at 2, 4 and 7 minutes.  10 mL of Gadavist contrast was injected intravenously.    COMPARISON:  PET-CT 04/13/2018 and CT abdomen and pelvis today.    FINDINGS:  The liver, spleen and pancreas are normal in size without focal lesions.  There is absence of the gallbladder compatible with prior cholecystectomy.    The adrenal glands are within normal limits bilaterally.    The kidneys are normal in size without hydronephrosis or focal lesion.    No aneurysmal dilatation of the abdominal aorta.  Duplicated IVC identified.    No evidence for retroperitoneal adenopathy throughout the abdomen.  No definite free fluid.   Impression       Unremarkable MRI of the abdomen specifically without evidence for solid organ focal fluid signal abnormality or enhancement to suggest definite  metastatic disease.    Please see PET-CT report for further details.     CLINICAL HISTORY:  Benign carcinoid tumor of the rectum    TECHNIQUE:  5.25 of GA68 Dotatate was administered intravenously in the right antecubital fossa. After an approximately 60 min distribution time, PET/CT images were acquired from the skull vertex to the mid thigh. Transmission images were acquired to correct for attenuation using a whole body low-dose CT scan without contrast with the arms positioned above the head.    COMPARISON:  None    FINDINGS:  Quality of the study: Adequate.    No abnormal foci of increased tracer uptake are present.    Physiologic uptake of the tracer is seen within the pituitary, liver, spleen, kidneys, adrenal glands and bowel.    Incidental CT findings: Sclerotic focus involving left, posterior aspect of T10 vertebral body has no associated abnormal tracer uptake.  Nonobstructing 3-4 mm stones are present in the left kidney.  Lower cervical spine fusion hardware is present.  Status post cholecystectomy.   Impression       No PET/CT findings to suggest somatostatin receptor avid disease.    Sclerotic focus involving the left posterior aspect of the T10 vertebral body without associated abnormal tracer uptake.  This could represent an area of treated disease.  Correlation with prior imaging is recommended.             Impression:  1. MAGGIE       Plan:restage every 6 month --eus next time then alternate flex sig with eus at 6 month intervals labs at 3 months and ct every6 months       Labs: Markers: 3 month intervals             Scans: 6 month intervals    Scopes: 6 month intervals eus next time then flex sig alternating with eus every 6 month x 2 years      Return to Clinic:6 month intervals    Orders placed this visit: No orders of the defined types were placed in this encounter.       45 new Minutes Face-to-Face; Counseling/Coordination of Care > 50 percent of Visit     Christopher Bloom MD, FACS  Mely Patel  AL Martinez Professor of Surgery, [facility]  [address]  [place]  [address]  Cell [phone]  ph. [phone]; [phone]  fax. [phone]  [email or web address]

## 2018-08-16 ENCOUNTER — TELEPHONE (OUTPATIENT)
Dept: GASTROENTEROLOGY | Facility: CLINIC | Age: 48
End: 2018-08-16

## 2018-08-16 DIAGNOSIS — D3A.00 CARCINOID TUMOR: Primary | ICD-10-CM

## 2018-08-17 NOTE — TELEPHONE ENCOUNTER
Patient Calls     Kei Tompkins MD   You 17 hours ago (6:35 PM)      Rectal EUS for f/u of rectal neuroendocrine tumor. Can be at [facility] with any of us. (Routing comment)      Please sign order

## 2018-08-22 ENCOUNTER — TELEPHONE (OUTPATIENT)
Dept: NEUROLOGY | Facility: HOSPITAL | Age: 48
End: 2018-08-22

## 2018-08-22 NOTE — TELEPHONE ENCOUNTER
----- Message from Dina Osuna sent at 8/22/2018  9:09 AM CDT -----  Contact: patient  EAW- Patient called to set up ct mri and labs before her appt with Dr. Bloom. Call back number  [phone]

## 2018-08-22 NOTE — TELEPHONE ENCOUNTER
Returned call to patient and discussed the appointment on October 31 and testing. EUS is scheduled for 9/10/18 with Dr Osuna and she will call and get the CT scheduled.  Lab orders faxed to [facility] with confirmation and patient will have them drawn late September.

## 2018-08-29 ENCOUNTER — TELEPHONE (OUTPATIENT)
Dept: GASTROENTEROLOGY | Facility: CLINIC | Age: 48
End: 2018-08-29

## 2018-08-29 NOTE — TELEPHONE ENCOUNTER
----- Message from Arianna Vargas MA sent at 8/15/2018  1:34 PM CDT -----  Regarding: Flex Sig due in September 2018  Harry Yuan,    Please call this patient to schedule for a Flex Sig to be done in September 2018 here at [facility]. This was Dr. Bloom's recommendation on his last clinic note.     [facility] able to pre[facility] the [facility] there, so they are giving the patient authorization to have it done here.    If you have any further questions, please call me.    Thanks,  Arianna-

## 2018-09-19 LAB
EXT 24 HR UR METANEPHRINE: NORMAL
EXT 24 HR UR NORMETANEPHRINE: NORMAL
EXT 24 HR UR NORMETANEPHRINE: NORMAL
EXT 25 HYDROXY VIT D2: NORMAL
EXT 25 HYDROXY VIT D3: NORMAL
EXT 5 HIAA 24 HR URINE: NORMAL
EXT 5 HIAA BLOOD: 7 NG/ML (ref 0–22)
EXT ACTH: NORMAL
EXT AFP: NORMAL
EXT ALBUMIN: 4 G/DL (ref 3.6–5.1)
EXT ALKALINE PHOSPHATASE: 84 U/L (ref 33–115)
EXT ALT: 24 U/L (ref 6–29)
EXT AMYLASE: NORMAL
EXT ANTI ISLET CELL AB: NORMAL
EXT ANTI PARIETAL CELL AB: NORMAL
EXT ANTI THYROID AB: NORMAL
EXT AST: 28 U/L (ref 10–35)
EXT BILIRUBIN DIRECT: NORMAL
EXT BILIRUBIN TOTAL: 0.4 MG/DL (ref 0.2–1.2)
EXT BK VIRUS DNA QN PCR: NORMAL
EXT BUN: 12 MG/DL (ref 7–25)
EXT C PEPTIDE: NORMAL
EXT CA 125: NORMAL
EXT CA 19-9: NORMAL
EXT CA 27-29: NORMAL
EXT CALCITONIN: NORMAL
EXT CALCIUM: 9.6 MG/DL (ref 8.6–10.2)
EXT CEA: NORMAL
EXT CHLORIDE: 107 MMOL/L (ref 98–110)
EXT CHOLESTEROL: NORMAL
EXT CHROMOGRANIN A: NORMAL
EXT CO2: 26 MMOL/L (ref 20–32)
EXT CREATININE UA: NORMAL
EXT CREATININE: 0.84 MG/DL (ref 0.5–1.1)
EXT CYCLOSPORONE LEVEL: NORMAL
EXT DOPAMINE: NORMAL
EXT EBV DNA BY PCR: NORMAL
EXT EPINEPHRINE: NORMAL
EXT FOLATE: NORMAL
EXT FREE T3: NORMAL
EXT FREE T4: NORMAL
EXT FSH: NORMAL
EXT GASTRIN RELEASING PEPTIDE: NORMAL
EXT GASTRIN RELEASING PEPTIDE: NORMAL
EXT GASTRIN: NORMAL
EXT GGT: NORMAL
EXT GHRELIN: NORMAL
EXT GLUCAGON: NORMAL
EXT GLUCOSE: 83 MG/DL (ref 65–99)
EXT GROWTH HORMONE: NORMAL
EXT HCV RNA QUANT PCR: NORMAL
EXT HDL: NORMAL
EXT HEMATOCRIT: 36.1 % (ref 35–45)
EXT HEMOGLOBIN A1C: NORMAL
EXT HEMOGLOBIN: 12 G/DL (ref 11.7–15.5)
EXT HISTAMINE 24 HR URINE: NORMAL
EXT HISTAMINE: NORMAL
EXT IGF-1: NORMAL
EXT IMMUNKNOW (NON-STIMULATED): NORMAL
EXT IMMUNKNOW (STIMULATED): NORMAL
EXT INR: NORMAL
EXT INSULIN: NORMAL
EXT LANREOTIDE LEVEL: NORMAL
EXT LDH, TOTAL: NORMAL
EXT LDL CHOLESTEROL: NORMAL
EXT LIPASE: NORMAL
EXT MAGNESIUM: NORMAL
EXT METANEPHRINE FREE PLASMA: NORMAL
EXT MOTILIN: NORMAL
EXT NEUROKININ A CAMB: NORMAL
EXT NEUROKININ A ISI: NORMAL
EXT NEUROTENSIN: NORMAL
EXT NOREPINEPHRINE: NORMAL
EXT NORMETANEPHRINE: NORMAL
EXT NSE: NORMAL
EXT OCTREOTIDE LEVEL: NORMAL
EXT PANCREASTATIN CAMB: NORMAL
EXT PANCREASTATIN ISI: 63 PG/ML (ref 10–135)
EXT PANCREATIC POLYPEPTIDE: NORMAL
EXT PHOSPHORUS: NORMAL
EXT PLATELETS: 274 1000/UL (ref 140–400)
EXT POTASSIUM: 4 MMOL/L (ref 3.5–5.3)
EXT PROGRAF LEVEL: NORMAL
EXT PROLACTIN: NORMAL
EXT PROTEIN TOTAL: 6.9 G/DL (ref 6.1–8.1)
EXT PROTEIN UA: NORMAL
EXT PT: NORMAL
EXT PTH, INTACT: NORMAL
EXT PTT: NORMAL
EXT RAPAMUNE LEVEL: NORMAL
EXT SEROTONIN: 165 NG/ML (ref 56–244)
EXT SODIUM: 142 MMOL/L (ref 135–146)
EXT SOMATOSTATIN: NORMAL
EXT SUBSTANCE P: NORMAL
EXT TRIGLYCERIDES: NORMAL
EXT TRYPTASE: NORMAL
EXT TSH: NORMAL
EXT URIC ACID: NORMAL
EXT URINE AMYLASE U/HR: NORMAL
EXT URINE AMYLASE U/L: NORMAL
EXT VASOACTIVE INTESTINAL POLYPEPTIDE: NORMAL
EXT VITAMIN B12: NORMAL
EXT VMA 24 HR URINE: NORMAL
EXT WBC: 5.6 1000/UL (ref 3.8–10.8)
NEURON SPECIFIC ENOLASE: NORMAL

## 2018-10-22 ENCOUNTER — HOSPITAL ENCOUNTER (OUTPATIENT)
Dept: RADIOLOGY | Facility: HOSPITAL | Age: 48
Discharge: HOME | End: 2018-10-22
Attending: SURGERY
Payer: OTHER GOVERNMENT

## 2018-10-22 DIAGNOSIS — C7A.026 MALIGNANT CARCINOID TUMOR OF THE RECTUM: ICD-10-CM

## 2018-10-22 DIAGNOSIS — D49.89 NEOPLASM OF ABDOMEN: ICD-10-CM

## 2018-10-22 PROCEDURE — 74177 CT ABD & PELVIS W/CONTRAST: CPT | Mod: TC

## 2018-10-22 PROCEDURE — 74177 CT ABD & PELVIS W/CONTRAST: CPT | Mod: 26,,, | Performed by: RADIOLOGY

## 2018-10-22 PROCEDURE — 25500020 PHARM REV CODE 255: Performed by: SURGERY

## 2018-10-22 RX ADMIN — IOHEXOL 30 ML: 350 INJECTION, SOLUTION INTRAVENOUS at 07:10

## 2018-10-22 RX ADMIN — IOHEXOL 100 ML: 350 INJECTION, SOLUTION INTRAVENOUS at 08:10

## 2018-10-31 ENCOUNTER — OFFICE VISIT (OUTPATIENT)
Dept: NEUROLOGY | Facility: HOSPITAL | Age: 48
End: 2018-10-31
Attending: SURGERY
Payer: OTHER GOVERNMENT

## 2018-10-31 VITALS
DIASTOLIC BLOOD PRESSURE: 81 MMHG | HEIGHT: 65 IN | SYSTOLIC BLOOD PRESSURE: 115 MMHG | BODY MASS INDEX: 41.67 KG/M2 | HEART RATE: 78 BPM | WEIGHT: 250.13 LBS | TEMPERATURE: 98 F

## 2018-10-31 DIAGNOSIS — D49.89 NEOPLASM OF ABDOMEN: ICD-10-CM

## 2018-10-31 PROCEDURE — 99214 OFFICE O/P EST MOD 30 MIN: CPT | Performed by: SURGERY

## 2018-10-31 NOTE — LETTER
October 31, 2018        [facility]  [address] [facility] [address]       NOLANETS: [facility]  A collaboration between [facility] and [facility] [address]  [address]  [place] [address]  Phone: [phone]  Fax: [phone]        MAIA Daugherty M.D., FACS  Jose Delgadillo M.D.  Kasi Roberts M.D.   dAe Ramsey M.D., FACS  DO Christopher Siddiqi M.D., FACS   Patient: Belkys Cote   MR Number: [id number]   YOB: 1970   Date of Visit: 10/31/2018     Dear Dr. Christiansen    Thank you for the kind referral of Belkys Cote. We saw this patient on 10/31/2018, and a copy of our clinic note is enclosed. We certainly appreciate the opportunity to participate in your patient's care.     If you have any questions or wish to discuss your patient further, please do not hesitate to contact us at [phone].       Kindest personal regards,          Christopher Bloom MD, FACS  The Jorge Martinez Professor of Surgery & Neurosciences  [facility], Dept. Of Surgery  [address], [address]  [place] [address] [phone]

## 2018-10-31 NOTE — LETTER
October 31, 2018        Elian Osuna MD  [address] [place] [address]  [address]  [facility]  [place] [address]       NOLANETS: [facility]  A collaboration between [facility] and [facility] [address]  [address]  [place] [address]  Phone: [phone]  Fax: [phone]        MAIA Daugherty M.D., FACS  Joes Delgadillo M.D.  Kasi Roberts M.D.   Ade Ramsey M.D., FACS  DO Christopher Siddiqi M.D., FACS   Patient: Belkys Cote   MR Number: [id number]   YOB: 1970   Date of Visit: 10/31/2018     Dear Dr. Osuna    Thank you for the kind referral of Belkys Cote. We saw this patient on 10/31/2018, and a copy of our clinic note is enclosed. We certainly appreciate the opportunity to participate in your patient's care.     If you have any questions or wish to discuss your patient further, please do not hesitate to contact us at [phone].       Kindest personal regards,          Christopher Bloom MD, FACS  The Jorge Holland of Surgery & Neurosciences  [facility], Dept. Of Surgery  [address], [address]  [place] [address] [phone]

## 2018-10-31 NOTE — PATIENT INSTRUCTIONS
Blood work / Lab work / Tumor markers every 3 mos.  Get lab work drawn at least 1 month prior to appointment. --- due December 2018 and March 2019    Scans:  CT Abd/Pelvis in 6 mos.  ---  Due in April 2019   Call Scheduling Department at [phone] to schedule scans prior to next appointment:      Return clinic appointment in 6 months with Dr. Bloom - appointment made    Alternate EUS and Flex Sig: rotate every 6 months  Flex Sig --- due in March 2019 --- orders given to patient

## 2018-10-31 NOTE — PROGRESS NOTES
"NOLANETS:  [facility]  A collaboration between [facility] and [facility]    PATIENT: Belkys Cote  MRN: [id number]  DATE: 10/31/2018    Vitals:    10/31/18 1306   BP: 115/81   BP Location: Right arm   Pulse: 78   Temp: 98 °F (36.7 °C)   TempSrc: Oral   Weight: 113.5 kg (250 lb 1.8 oz)   Height: 5' 5" (1.651 m)      Last 2 Weight Measurements:   Wt Readings from Last 2 Encounters:   10/31/18 113.5 kg (250 lb 1.8 oz)   04/18/18 105 kg (231 lb 9.5 oz)     BMI: Body mass index is 41.62 kg/m².    Karnofsky Score    Karnofsky Score:  100% - Normal, No Complaints, No Evidence of Disease       Diagnosis:   1. Neoplasm of abdomen      Interval History: Ms. Cote returns to the office  In routine follow up of a rectal NET    Past Medical History:   Diagnosis Date    Allergy     Anxiety     Rectal carcinoid tumor 01/2018       Past Surgical History:   Procedure Laterality Date    ankle sugery   01/2016    CHOLECYSTECTOMY  01/2001    HYSTERECTOMY  01/2000    neck sugery  01/2008       Review of patient's allergies indicates:   Allergen Reactions    Betadine [povidone-iodine] Rash    Chlorhexidine Hives    Epinephrine      Neuroendocrine Tumor patient           Current Outpatient Medications   Medication Sig Dispense Refill    fexofenadine-pseudoephedrine (ALLEGRA-D 24) 180-240 mg per 24 hr tablet Take 1 tablet by mouth once daily.      fluticasone (FLONASE) 50 mcg/actuation nasal spray 1 spray by Nasal route once daily.       montelukast (SINGULAIR) 10 mg tablet Take 10 mg by mouth once daily.       ondansetron (ZOFRAN-ODT) 4 MG TbDL Take 8 mg by mouth every 12 (twelve) hours.      topiramate (TOPAMAX) 100 MG tablet Take 25 mg by mouth 2 (two) times daily.       aspirin 81 MG Chew Take 81 mg by mouth daily      hydrocodone-acetaminophen 5-325mg (NORCO) 5-325 mg per tablet Take 1-2 tablets by mouth every 6 (six) " hours as needed  for Pain       No current facility-administered medications for this visit.        Review of Systems     Number of Days per Week Number per Day   DIARRHEA 7 2   BRISTOL STOOL SCALE RATING Type 6-7    FLUSHING 0    WHEEZING 0      WEIGHT GAIN/LOSS stable   ENERGY RATING (0-10) 10        Physical Exam: deferred.       Pathology Data:        Laboratory Data:  Neuroendocrine Labs Latest Ref Rng & Units 9/19/2018   EXT 5 HIAA BLOOD 0 - 22 ng/ml 7   EXT SEROTONIN 56 - 244 ng/ml 165   EXT PANCREASTATIN ATUL 10 - 135 pg/ml 63   EXT WBC 3.8 - 10.8 1000/ul 5.6   EXT HGB 11.7 - 15.5 g/dl 12.0   EXT HCT 35.0 - 45.0 % 36.1   EXT PLATLETS 140 - 400 1000/ul 274   EXT GLUCOSE 65 - 99 mg/dl 83   EXT BUN 7 - 25 mg/dl 12   EXT CREATININE 0.50 - 1.10 mg/dl 0.84   EXT  - 146 mmol/l 142   EXT K 3.5 - 5.3 mmol/l 4.0   EXT CHLORIDE 98 - 110 mmol/l 107   EXT CO2 20 - 32 mmol/l 26   EXT CALCIUM 8.6 - 10.2 mg/dl 9.6   EXT PROTEIN, TOTAL 6.1 - 8.1 g/dl 6.9   EXT ALBUMIN 3.6 - 5.1 g/dl 4.0   EXT TOTAL BILIRUBIN 0.2 - 1.2 mg/dl 0.4   EXT ALK PHOSPHATASE 33 - 115 u/l 84   EXT SGOT (AST) 10 - 35 u/l 28   EXT ALT 6 - 29 u/l 24   NSE 0 - 10.8 ng/ml    Weight           Radiology Data:  FINDINGS:  There is no consolidation within the visualized lungs.  Remote operative change from cholecystectomy with surgical clips in the gallbladder fossa.  The liver is enlarged measuring 16.9 cm in craniocaudal dimension.  There is no focal enhancing hepatic lesion.  The spleen and pancreas are stable in size without focal lesion.  The adrenal glands are within normal limits.    The kidneys are stable in size with uptake and excretion of contrast bilaterally.    No signs of appendicitis with normal caliber appendix identified.    No aneurysmal dilatation of the abdominal aorta.  Soft tissue fullness in the left adnexa suggestive for ovary with cysts similar to prior.  Remote operative change from hysterectomy.    Subcentimeter sclerotic focus  central T12 and more prominent in the left aspect of T10 unchanged from prior    Colonic diverticulosis without evidence for acute diverticulitis.  There is no free intraperitoneal gas or fluid collection.      Impression       No significant change from prior.  Continued T12 and more prominent left T11 vertebral body sclerotic foci which are nonspecific and may represent bone islands or treated lesions.    No evidence for enhancing hepatic lesion or adenopathy to suggest definite active metastatic disease.           Impression:  1. MAGGIE       Plan:restage with flex sig in 6 months       Labs: Markers: 3 month intervals             Scans: 6 month intervals    Scopes: 6 month intervals    Return to Clinic:6 month intervals    Orders placed this visit:   Orders Placed This Encounter   Procedures    CT Abdomen Pelvis With Contrast    Comprehensive metabolic panel    CBC auto differential    Serotonin (Neuroendo)    Pancreastatin    Substance P    Neuron Specific Enolase, Serum        25 Minutes Face-to-Face; Counseling/Coordination of Care > 50 percent of Visit     Christopher Bloom MD, FACS  The Jorge Martinez Professor of Surgery, [facility]  [address]  [place]  [address]  Cell [phone]  ph. [phone]; [phone]  fax. [phone]  [email or web address]

## 2019-01-16 LAB
EXT 24 HR UR METANEPHRINE: ABNORMAL
EXT 24 HR UR NORMETANEPHRINE: ABNORMAL
EXT 24 HR UR NORMETANEPHRINE: ABNORMAL
EXT 25 HYDROXY VIT D2: ABNORMAL
EXT 25 HYDROXY VIT D3: ABNORMAL
EXT 5 HIAA 24 HR URINE: ABNORMAL
EXT 5 HIAA BLOOD: 131 NG/ML (ref 0–22)
EXT ACTH: ABNORMAL
EXT AFP: ABNORMAL
EXT ALBUMIN: 4.2 G/DL (ref 3.6–5.1)
EXT ALKALINE PHOSPHATASE: 102 U/L (ref 33–115)
EXT ALT: 22 U/L (ref 6–29)
EXT AMYLASE: ABNORMAL
EXT ANTI ISLET CELL AB: ABNORMAL
EXT ANTI PARIETAL CELL AB: ABNORMAL
EXT ANTI THYROID AB: ABNORMAL
EXT AST: 23 U/L (ref 10–35)
EXT BILIRUBIN DIRECT: ABNORMAL MG/DL
EXT BILIRUBIN TOTAL: 0.3 MG/DL (ref 0.2–1.2)
EXT BK VIRUS DNA QN PCR: ABNORMAL
EXT BUN: 20 MG/DL (ref 7–25)
EXT C PEPTIDE: ABNORMAL
EXT CA 125: ABNORMAL
EXT CA 19-9: ABNORMAL
EXT CA 27-29: ABNORMAL
EXT CALCITONIN: ABNORMAL
EXT CALCIUM: 9.4 MG/DL (ref 8.6–10.2)
EXT CEA: ABNORMAL
EXT CHLORIDE: 107 MMOL/L (ref 98–110)
EXT CHOLESTEROL: ABNORMAL
EXT CHROMOGRANIN A: ABNORMAL
EXT CO2: 27 MMOL/L (ref 20–32)
EXT CREATININE UA: ABNORMAL
EXT CREATININE: 0.92 MG/DL (ref 0.5–1.1)
EXT CYCLOSPORONE LEVEL: ABNORMAL
EXT DOPAMINE: ABNORMAL
EXT EBV DNA BY PCR: ABNORMAL
EXT EPINEPHRINE: ABNORMAL
EXT FOLATE: ABNORMAL
EXT FREE T3: ABNORMAL
EXT FREE T4: ABNORMAL
EXT FSH: ABNORMAL
EXT GASTRIN RELEASING PEPTIDE: ABNORMAL
EXT GASTRIN RELEASING PEPTIDE: ABNORMAL
EXT GASTRIN: ABNORMAL
EXT GGT: ABNORMAL
EXT GHRELIN: ABNORMAL
EXT GLUCAGON: ABNORMAL
EXT GLUCOSE: 103 MG/DL (ref 65–99)
EXT GROWTH HORMONE: ABNORMAL
EXT HCV RNA QUANT PCR: ABNORMAL
EXT HDL: ABNORMAL
EXT HEMATOCRIT: 38.6 % (ref 35–45)
EXT HEMOGLOBIN A1C: ABNORMAL
EXT HEMOGLOBIN: 12.8 G/DL (ref 11.7–15.5)
EXT HISTAMINE 24 HR URINE: ABNORMAL
EXT HISTAMINE: ABNORMAL
EXT IGF-1: ABNORMAL
EXT IMMUNKNOW (NON-STIMULATED): ABNORMAL
EXT IMMUNKNOW (STIMULATED): ABNORMAL
EXT INR: ABNORMAL
EXT INSULIN: ABNORMAL
EXT LANREOTIDE LEVEL: ABNORMAL
EXT LDH, TOTAL: ABNORMAL
EXT LDL CHOLESTEROL: ABNORMAL
EXT LIPASE: ABNORMAL
EXT MAGNESIUM: ABNORMAL
EXT METANEPHRINE FREE PLASMA: ABNORMAL
EXT MOTILIN: ABNORMAL
EXT NEUROKININ A CAMB: ABNORMAL
EXT NEUROKININ A ISI: ABNORMAL
EXT NEUROTENSIN: ABNORMAL
EXT NOREPINEPHRINE: ABNORMAL
EXT NORMETANEPHRINE: ABNORMAL
EXT NSE: ABNORMAL
EXT OCTREOTIDE LEVEL: ABNORMAL
EXT PANCREASTATIN CAMB: ABNORMAL
EXT PANCREASTATIN ISI: 51 PG/ML (ref 10–135)
EXT PANCREATIC POLYPEPTIDE: ABNORMAL
EXT PHOSPHORUS: ABNORMAL
EXT PLATELETS: ABNORMAL
EXT POTASSIUM: 3.7 MMOL/L (ref 3.5–5.3)
EXT PROGRAF LEVEL: ABNORMAL
EXT PROLACTIN: ABNORMAL
EXT PROTEIN TOTAL: 7.4 G/DL (ref 6.1–8.1)
EXT PROTEIN UA: ABNORMAL
EXT PT: ABNORMAL
EXT PTH, INTACT: ABNORMAL
EXT PTT: ABNORMAL
EXT RAPAMUNE LEVEL: ABNORMAL
EXT SEROTONIN: <20 NG/ML (ref 56–244)
EXT SODIUM: 140 MMOL/L (ref 135–146)
EXT SOMATOSTATIN: ABNORMAL
EXT SUBSTANCE P: ABNORMAL
EXT TRIGLYCERIDES: ABNORMAL
EXT TRYPTASE: ABNORMAL
EXT TSH: ABNORMAL
EXT URIC ACID: ABNORMAL
EXT URINE AMYLASE U/HR: ABNORMAL
EXT URINE AMYLASE U/L: ABNORMAL
EXT VASOACTIVE INTESTINAL POLYPEPTIDE: ABNORMAL
EXT VITAMIN B12: ABNORMAL
EXT VMA 24 HR URINE: ABNORMAL
EXT WBC: 6.4 1000/UL (ref 3.8–10.8)
NEURON SPECIFIC ENOLASE: ABNORMAL

## 2019-02-07 ENCOUNTER — TELEPHONE (OUTPATIENT)
Dept: NEUROLOGY | Facility: HOSPITAL | Age: 49
End: 2019-02-07

## 2019-02-07 NOTE — TELEPHONE ENCOUNTER
----- Message from Christopher Bloom MD sent at 2/5/2019  3:47 PM CST -----  Check 5 hiaa value and if needed repeat it

## 2019-02-07 NOTE — TELEPHONE ENCOUNTER
Spoke to patient, advised patient of her 5HIAA level and Dr. Bloom's recommendation to have the level repeated. New orders faxed to [facility], [address] for 5HIAA level. Patient will have drawn after she finishes her 10 days of antibiotics, and will call to inform the office once it has been drawn. Patient verbalized her understanding and has no further questions at this time.

## 2019-02-19 ENCOUNTER — TELEPHONE (OUTPATIENT)
Dept: NEUROLOGY | Facility: HOSPITAL | Age: 49
End: 2019-02-19

## 2019-02-19 LAB

## 2019-02-19 NOTE — TELEPHONE ENCOUNTER
----- Message from Mallorie Johnson sent at 2/19/2019 10:41 AM CST -----  Contact: Patient  EAW:  Patient called and is planning to come to [facility] here in the building this afternoon to get her 5-HIAA redrawn.  Please give order to front or fax it to [phone].  Thank you  abc

## 2019-05-07 ENCOUNTER — TELEPHONE (OUTPATIENT)
Dept: NEUROLOGY | Facility: HOSPITAL | Age: 49
End: 2019-05-07

## 2019-05-07 NOTE — TELEPHONE ENCOUNTER
Spoke to patient. Advised her that we canceled her appointment for tomorrow, due to not having updated scans and labs. Patient schedule for CT Scan and return appointment to see Dr. Bloom. Lab orders faxed to [facility], [address]. Patient has no further questions at this time.

## 2019-05-13 ENCOUNTER — HOSPITAL ENCOUNTER (OUTPATIENT)
Dept: RADIOLOGY | Facility: HOSPITAL | Age: 49
Discharge: HOME | End: 2019-05-13
Attending: SURGERY
Payer: COMMERCIAL

## 2019-05-13 DIAGNOSIS — D49.89 NEOPLASM OF ABDOMEN: ICD-10-CM

## 2019-05-13 LAB
EXT 24 HR UR METANEPHRINE: ABNORMAL
EXT 24 HR UR NORMETANEPHRINE: ABNORMAL
EXT 24 HR UR NORMETANEPHRINE: ABNORMAL
EXT 25 HYDROXY VIT D2: ABNORMAL
EXT 25 HYDROXY VIT D3: ABNORMAL
EXT 5 HIAA 24 HR URINE: ABNORMAL
EXT 5 HIAA BLOOD: 8 NG/ML (ref 0–22)
EXT ACTH: ABNORMAL
EXT AFP: ABNORMAL
EXT ALBUMIN: 4.2 G/DL (ref 3.6–5.1)
EXT ALKALINE PHOSPHATASE: 106 U/L (ref 33–115)
EXT ALT: 17 U/L (ref 6–29)
EXT AMYLASE: ABNORMAL
EXT ANTI ISLET CELL AB: ABNORMAL
EXT ANTI PARIETAL CELL AB: ABNORMAL
EXT ANTI THYROID AB: ABNORMAL
EXT AST: 23 U/L (ref 10–35)
EXT BILIRUBIN DIRECT: ABNORMAL
EXT BILIRUBIN TOTAL: 0.4 MG/DL (ref 0.2–1.2)
EXT BK VIRUS DNA QN PCR: ABNORMAL
EXT BUN: 15 MG/DL (ref 7–25)
EXT C PEPTIDE: ABNORMAL
EXT CA 125: ABNORMAL
EXT CA 19-9: ABNORMAL
EXT CA 27-29: ABNORMAL
EXT CALCITONIN: ABNORMAL
EXT CALCIUM: 9.6 MG/DL (ref 8.6–10.2)
EXT CEA: ABNORMAL
EXT CHLORIDE: 108 MMOL/L (ref 98–110)
EXT CHOLESTEROL: ABNORMAL
EXT CHROMOGRANIN A: ABNORMAL
EXT CO2: 27 MMOL/L (ref 20–32)
EXT CREATININE UA: ABNORMAL
EXT CREATININE: 0.91 MG/DL (ref 0.5–1.1)
EXT CYCLOSPORONE LEVEL: ABNORMAL
EXT DOPAMINE: ABNORMAL
EXT EBV DNA BY PCR: ABNORMAL
EXT EPINEPHRINE: ABNORMAL
EXT FOLATE: ABNORMAL
EXT FREE T3: ABNORMAL
EXT FREE T4: ABNORMAL
EXT FSH: ABNORMAL
EXT GASTRIN RELEASING PEPTIDE: ABNORMAL
EXT GASTRIN RELEASING PEPTIDE: ABNORMAL
EXT GASTRIN: ABNORMAL
EXT GGT: ABNORMAL
EXT GHRELIN: ABNORMAL
EXT GLUCAGON: ABNORMAL
EXT GLUCOSE: 92 MG/DL (ref 65–99)
EXT GROWTH HORMONE: ABNORMAL
EXT HCV RNA QUANT PCR: ABNORMAL
EXT HDL: ABNORMAL
EXT HEMATOCRIT: 39 % (ref 35–45)
EXT HEMOGLOBIN A1C: ABNORMAL
EXT HEMOGLOBIN: 13.1 G/DL (ref 11.7–15.5)
EXT HISTAMINE 24 HR URINE: ABNORMAL
EXT HISTAMINE: ABNORMAL
EXT IGF-1: ABNORMAL
EXT IMMUNKNOW (NON-STIMULATED): ABNORMAL
EXT IMMUNKNOW (STIMULATED): ABNORMAL
EXT INR: ABNORMAL
EXT INSULIN: ABNORMAL
EXT LANREOTIDE LEVEL: ABNORMAL
EXT LDH, TOTAL: ABNORMAL
EXT LDL CHOLESTEROL: ABNORMAL
EXT LIPASE: ABNORMAL
EXT MAGNESIUM: ABNORMAL
EXT METANEPHRINE FREE PLASMA: ABNORMAL
EXT MOTILIN: ABNORMAL
EXT NEUROKININ A CAMB: ABNORMAL
EXT NEUROKININ A ISI: <10 PG/ML (ref 0–40)
EXT NEUROTENSIN: ABNORMAL
EXT NOREPINEPHRINE: ABNORMAL
EXT NORMETANEPHRINE: ABNORMAL
EXT NSE: ABNORMAL
EXT OCTREOTIDE LEVEL: ABNORMAL
EXT PANCREASTATIN CAMB: ABNORMAL
EXT PANCREASTATIN ISI: 56 PG/ML (ref 10–135)
EXT PANCREATIC POLYPEPTIDE: ABNORMAL
EXT PHOSPHORUS: ABNORMAL
EXT PLATELETS: 252 1000/UL (ref 140–400)
EXT POTASSIUM: 4.2 MMOL/L (ref 3.5–5.3)
EXT PROGRAF LEVEL: ABNORMAL
EXT PROLACTIN: ABNORMAL
EXT PROTEIN TOTAL: 7 MG/DL (ref 8.6–10.2)
EXT PROTEIN UA: ABNORMAL
EXT PT: ABNORMAL
EXT PTH, INTACT: ABNORMAL
EXT PTT: ABNORMAL
EXT RAPAMUNE LEVEL: ABNORMAL
EXT SEROTONIN: 184 NG/ML (ref 56–244)
EXT SODIUM: 142 MMOL/L (ref 135–146)
EXT SOMATOSTATIN: ABNORMAL
EXT SUBSTANCE P: ABNORMAL
EXT TRIGLYCERIDES: ABNORMAL
EXT TRYPTASE: ABNORMAL
EXT TSH: ABNORMAL
EXT URIC ACID: ABNORMAL
EXT URINE AMYLASE U/HR: ABNORMAL
EXT URINE AMYLASE U/L: ABNORMAL
EXT VASOACTIVE INTESTINAL POLYPEPTIDE: ABNORMAL
EXT VITAMIN B12: ABNORMAL
EXT VMA 24 HR URINE: ABNORMAL
EXT WBC: 5.2 1000/UL (ref 3.8–10.8)
NEURON SPECIFIC ENOLASE: ABNORMAL

## 2019-05-13 PROCEDURE — 74177 CT ABDOMEN PELVIS WITH CONTRAST: ICD-10-PCS | Mod: 26,,, | Performed by: RADIOLOGY

## 2019-05-13 PROCEDURE — 74177 CT ABD & PELVIS W/CONTRAST: CPT | Mod: TC

## 2019-05-13 PROCEDURE — 74177 CT ABD & PELVIS W/CONTRAST: CPT | Mod: 26,,, | Performed by: RADIOLOGY

## 2019-05-13 PROCEDURE — 25500020 PHARM REV CODE 255: Performed by: SURGERY

## 2019-05-13 RX ADMIN — IOHEXOL 100 ML: 350 INJECTION, SOLUTION INTRAVENOUS at 10:05

## 2019-05-13 RX ADMIN — IOHEXOL 50 ML: 350 INJECTION, SOLUTION INTRAVENOUS at 10:05

## 2019-05-22 ENCOUNTER — OFFICE VISIT (OUTPATIENT)
Dept: NEUROLOGY | Facility: HOSPITAL | Age: 49
End: 2019-05-22
Attending: SURGERY
Payer: COMMERCIAL

## 2019-05-22 VITALS
DIASTOLIC BLOOD PRESSURE: 74 MMHG | BODY MASS INDEX: 41.89 KG/M2 | WEIGHT: 251.44 LBS | SYSTOLIC BLOOD PRESSURE: 110 MMHG | HEART RATE: 74 BPM | TEMPERATURE: 99 F | HEIGHT: 65 IN

## 2019-05-22 DIAGNOSIS — C7A.026 MALIGNANT CARCINOID TUMOR OF THE RECTUM: Primary | ICD-10-CM

## 2019-05-22 DIAGNOSIS — D49.89 NEOPLASM OF ABDOMEN: ICD-10-CM

## 2019-05-22 PROCEDURE — 99214 OFFICE O/P EST MOD 30 MIN: CPT | Performed by: SURGERY

## 2019-05-22 NOTE — LETTER
May 22, 2019        Elian Osuna MD  [facility]  [address] [place] [address], [address]  [place] [address]       NOLANETS: [facility]  A collaboration between [facility] and [facility] [address]  [address]  [place] [address]  Phone: [phone]  Fax: [phone]        MAIA Daugherty M.D., FACS  Kasi Roberts M.D.   Ade Ramsey M.D., FACS  DO Christopher Siddiqi M.D., FACS   Patient: Belkys Cote   MR Number: [id number]   YOB: 1970   Date of Visit: 5/22/2019     Dear Dr. Osuna    Thank you for the kind referral of Belkys Cote. We saw this patient on 5/22/2019, and a copy of our clinic note is enclosed. We certainly appreciate the opportunity to participate in your patient's care.     If you have any questions or wish to discuss your patient further, please do not hesitate to contact us at [phone].       Kindest personal regards,        Christopher Bloom MD, FACS  The Jorge Martinez Professor of Surgery & Neurosciences  [facility], Dept. Of Surgery  [address] [place] [address], [address]  [place] [address] [phone]

## 2019-05-22 NOTE — PROGRESS NOTES
"NOLANETS:  [facility]  A collaboration between [facility] and [facility]    PATIENT: Belkys Cote  MRN: [id number]  DATE: 5/22/2019    Vitals:    05/22/19 1144   BP: 110/74   Pulse: 74   Temp: 98.7 °F (37.1 °C)   TempSrc: Oral   Weight: 114 kg (251 lb 7 oz)   Height: 5' 5" (1.651 m)      Last 2 Weight Measurements:   Wt Readings from Last 2 Encounters:   05/22/19 114 kg (251 lb 7 oz)   10/31/18 113.5 kg (250 lb 1.8 oz)     BMI: Body mass index is 41.84 kg/m².    Karnofsky Score    Karnofsky Score:  90% - Able to Carry on Normal Activity: Minor Symptoms of Disease       Diagnosis:   1. Malignant carcinoid tumor of the rectum    2. Neoplasm of abdomen      Interval History: Ms. Cote returns to the office  In routine follow up of a rectal NET.    Past Medical History:   Diagnosis Date    Allergy     Anxiety     Rectal carcinoid tumor 01/2018       Past Surgical History:   Procedure Laterality Date    ankle sugery   01/2016    CHOLECYSTECTOMY  01/2001    HYSTERECTOMY  01/2000    neck sugery  01/2008       Review of patient's allergies indicates:   Allergen Reactions    Betadine [povidone-iodine] Rash    Chlorhexidine Hives    Epinephrine      Neuroendocrine Tumor patient           Current Outpatient Medications   Medication Sig Dispense Refill    aspirin 81 MG Chew Take 81 mg by mouth daily      fexofenadine-pseudoephedrine (ALLEGRA-D 24) 180-240 mg per 24 hr tablet Take 1 tablet by mouth once daily.      fluticasone (FLONASE) 50 mcg/actuation nasal spray 1 spray by Nasal route once daily.       hydrocodone-acetaminophen 5-325mg (NORCO) 5-325 mg per tablet Take 1-2 tablets by mouth every 6 (six) hours as needed  for Pain      montelukast (SINGULAIR) 10 mg tablet Take 10 mg by mouth once daily.       ondansetron (ZOFRAN-ODT) 4 MG TbDL Take 8 mg by mouth every 12 (twelve) hours.      topiramate (TOPAMAX) " 100 MG tablet Take 25 mg by mouth 2 (two) times daily.        No current facility-administered medications for this visit.        Review of Systems     Number of Days per Week Number per Day   DIARRHEA 2 3   BRISTOL STOOL SCALE RATING 6-7    FLUSHING 0    WHEEZING 0      WEIGHT GAIN/LOSS Stable at 251 today   ENERGY RATING (0-10) 10        Physical Exam: deferred.       Pathology Data:        Laboratory Data:  Neuroendocrine Labs Latest Ref Rng & Units 5/13/2019 2/19/2019   EXT 5 HIAA BLOOD 0 - 22 ng/ml  12   EXT SEROTONIN 56 - 244 ng/ml 184    EXT PANCREASTATIN ATUL 10 - 135 pg/ml 56    EXT WBC 3.8 - 10.8 1000/ul 5.2    EXT HGB 11.7 - 15.5 g/dl 13.1    EXT HCT 35 - 45 % 39.0    EXT PLATLETS 140 - 400 1000/ul 252    EXT GLUCOSE 65 - 99 mg/dl 92    EXT BUN 7 - 25 mg/dl 15    EXT CREATININE 0.5 - 1.1 mg/dl 0.91    EXT  - 146 mmol/l 142    EXT K 3.5 - 5.3 mmol/l 4.2    EXT CHLORIDE 98 - 110 mmol/l 108    EXT CO2 20 - 32 mmol/l 27    EXT CALCIUM 8.6 - 10.2 mg/dl 9.6    EXT PROTEIN, TOTAL 8.6 - 10.2 mg/dl 7.0 (A)    EXT ALBUMIN 3.6 - 5.1 g/dl 4.2    EXT TOTAL BILIRUBIN 0.2 - 1.2 mg/dl 0.4    EXT ALK PHOSPHATASE 33 - 115 u/l 106    EXT SGOT (AST) 10 - 35 u/l 23    EXT ALT 6 - 29 u/l 17    NSE 0 - 10.8 ng/ml     Weight        Neuroendocrine Labs Latest Ref Rng & Units 1/16/2019   EXT 5 HIAA BLOOD 0 - 22 ng/ml 131 (A)   EXT SEROTONIN 56 - 244 ng/ml <20   EXT PANCREASTATIN ATUL 10 - 135 pg/ml 51   EXT WBC 3.8 - 10.8 1000/ul 6.4   EXT HGB 11.7 - 15.5 g/dl 12.8   EXT HCT 35 - 45 % 38.6   EXT PLATLETS 140 - 400 1000/ul    EXT GLUCOSE 65 - 99 mg/dl 103 (A)   EXT BUN 7 - 25 mg/dl 20   EXT CREATININE 0.5 - 1.1 mg/dl 0.92   EXT  - 146 mmol/l 140   EXT K 3.5 - 5.3 mmol/l 3.7   EXT CHLORIDE 98 - 110 mmol/l 107   EXT CO2 20 - 32 mmol/l 27   EXT CALCIUM 8.6 - 10.2 mg/dl 9.4   EXT PROTEIN, TOTAL 8.6 - 10.2 mg/dl 7.4   EXT ALBUMIN 3.6 - 5.1 g/dl 4.2   EXT TOTAL BILIRUBIN 0.2 - 1.2 mg/dl 0.3   EXT ALK PHOSPHATASE 33 - 115 u/l  102   EXT SGOT (AST) 10 - 35 u/l 23   EXT ALT 6 - 29 u/l 22   NSE 0 - 10.8 ng/ml    Weight           Radiology Data:    FINDINGS:  Heart: Normal in size. No pericardial effusion. No significant calcific coronary atherosclerosis.    Lungs: Visualized portions of the lungs are clear.    Liver: Normal in size and contour.  No focal hepatic lesion.  No abnormal areas of enhancement.    Gallbladder: Surgically absent.    Bile Ducts: No evidence of dilated ducts.    Pancreas: No mass or peripancreatic fat stranding.    Spleen: Unremarkable.    Stomach and duodenum: Unremarkable.    Adrenals: Unremarkable.    Kidneys/ Ureters: Normal in size and location. Normal enhancement. 2 mm nonobstructing stone in the mid upper pole of the left kidney and 3 mm nonobstructing stone in the inferior pole of the left kidney.  No hydronephrosis.  No ureteral dilatation.    Bladder: No evidence of wall thickening.    Reproductive organs: Uterus is surgically absent.    Bowel/Mesentery: Small bowel is normal in caliber with no evidence of obstruction. No evidence of inflammation or wall thickening.  Colon demonstrates no focal wall thickening.    Lymph nodes: No lymphadenapathy.    Vasculature: No aneurysm. No significant calcific atherosclerosis.  Duplicated IVC.    Abdominal wall:  Unremarkable.    Bones: No acute fracture. Stable sclerotic lesion of the T10 vertebral body measuring 1.9 cm, previously 2.0 cm (series 602, image 104).  Stable punctate sclerotic focus in the T12 vertebral body.  No new sclerotic lesions.      Impression       Stable nonspecific sclerotic focus in the T10 and T12 vertebral bodies, unchanged.  This may represent sequela from treated metastases.    No abnormal area of enhancement in the abdomen or pelvis to suggest active metastatic disease.  No hepatic lesions.             Impression:  1. jeyson       Plan: restage in 6 monthe with ct mri and eus of rectum       Labs: Markers: 3 month intervals             Scans:  6 month intervals    Scopes: 6 month intervals--eus next time in 6 months      Return to Clinic:6 month intervals    Orders placed this visit:   Orders Placed This Encounter   Procedures    CT Abdomen Pelvis With Contrast    MRI Abdomen W WO Contrast    5-HIAA Plasma (Neuoendocrine)    Substance P    Pancreastatin    Neuron Specific Enolase, Serum    Comprehensive metabolic panel    CBC auto differential        25 est Minutes Face-to-Face; Counseling/Coordination of Care > 50 percent of Visit     Christopher Bloom MD, FACS  The [employer] Professor of Surgery, [facility]  [address], [address]  [place]  [address]  Cell [phone]  ph. [phone]; [phone]  fax. [phone]  [email or web address]

## 2019-05-22 NOTE — PATIENT INSTRUCTIONS
Blood work / Lab work / Tumor markers every 3 mos.  Get lab work drawn at least 1 month prior to appointment. --- due August 2019 and November 2019    Scans:  CT Abd/Pelvis and MRI liver in 6 mos.  ---  Due in November 2019  Call Scheduling Department at [phone] to schedule scans prior to next appointment:      Return clinic appointment in 6 months with Dr. Bloom - appointment made    Alternate EUS and Flex Sig: rotate every 6 months --- EUS due in August 2019

## 2019-06-20 LAB
EXT 24 HR UR METANEPHRINE: ABNORMAL
EXT 24 HR UR NORMETANEPHRINE: ABNORMAL
EXT 24 HR UR NORMETANEPHRINE: ABNORMAL
EXT 25 HYDROXY VIT D2: ABNORMAL
EXT 25 HYDROXY VIT D3: ABNORMAL
EXT 5 HIAA 24 HR URINE: ABNORMAL
EXT 5 HIAA BLOOD: ABNORMAL
EXT ACTH: ABNORMAL
EXT AFP: ABNORMAL
EXT ALBUMIN: ABNORMAL
EXT ALKALINE PHOSPHATASE: ABNORMAL
EXT ALT: ABNORMAL
EXT AMYLASE: ABNORMAL
EXT ANTI ISLET CELL AB: ABNORMAL
EXT ANTI PARIETAL CELL AB: ABNORMAL
EXT ANTI THYROID AB: ABNORMAL
EXT AST: ABNORMAL
EXT BILIRUBIN DIRECT: ABNORMAL
EXT BILIRUBIN TOTAL: 0.5 MG/DL (ref 0.1–1.3)
EXT BK VIRUS DNA QN PCR: ABNORMAL
EXT BUN: ABNORMAL
EXT C PEPTIDE: ABNORMAL
EXT CA 125: ABNORMAL
EXT CA 19-9: ABNORMAL
EXT CA 27-29: ABNORMAL
EXT CALCITONIN: ABNORMAL
EXT CALCIUM: ABNORMAL
EXT CEA: ABNORMAL
EXT CHLORIDE: ABNORMAL
EXT CHOLESTEROL: ABNORMAL
EXT CHROMOGRANIN A: ABNORMAL
EXT CO2: 23 MEQ/L (ref 22–32)
EXT CREATININE UA: ABNORMAL
EXT CREATININE: 1.1 MG/DL (ref 0.6–1.3)
EXT CYCLOSPORONE LEVEL: ABNORMAL
EXT DOPAMINE: ABNORMAL
EXT EBV DNA BY PCR: ABNORMAL
EXT EPINEPHRINE: ABNORMAL
EXT FOLATE: ABNORMAL
EXT FREE T3: ABNORMAL
EXT FREE T4: ABNORMAL
EXT FSH: ABNORMAL
EXT GASTRIN RELEASING PEPTIDE: ABNORMAL
EXT GASTRIN RELEASING PEPTIDE: ABNORMAL
EXT GASTRIN: ABNORMAL
EXT GGT: ABNORMAL
EXT GHRELIN: ABNORMAL
EXT GLUCAGON: ABNORMAL
EXT GLUCOSE: 88 MG/DL (ref 70–110)
EXT GROWTH HORMONE: ABNORMAL
EXT HCV RNA QUANT PCR: ABNORMAL
EXT HDL: ABNORMAL
EXT HEMATOCRIT: ABNORMAL
EXT HEMOGLOBIN A1C: ABNORMAL
EXT HEMOGLOBIN: 12.6 G/DL (ref 12–16)
EXT HISTAMINE 24 HR URINE: ABNORMAL
EXT HISTAMINE: ABNORMAL
EXT IGF-1: ABNORMAL
EXT IMMUNKNOW (NON-STIMULATED): ABNORMAL
EXT IMMUNKNOW (STIMULATED): ABNORMAL
EXT INR: ABNORMAL
EXT INSULIN: ABNORMAL
EXT LANREOTIDE LEVEL: ABNORMAL
EXT LDH, TOTAL: ABNORMAL
EXT LDL CHOLESTEROL: ABNORMAL
EXT LIPASE: ABNORMAL
EXT MAGNESIUM: ABNORMAL
EXT METANEPHRINE FREE PLASMA: ABNORMAL
EXT MOTILIN: ABNORMAL
EXT NEUROKININ A CAMB: ABNORMAL
EXT NEUROKININ A ISI: ABNORMAL
EXT NEUROTENSIN: ABNORMAL
EXT NOREPINEPHRINE: ABNORMAL
EXT NORMETANEPHRINE: ABNORMAL
EXT NSE: ABNORMAL
EXT OCTREOTIDE LEVEL: ABNORMAL
EXT PANCREASTATIN CAMB: ABNORMAL
EXT PANCREASTATIN ISI: ABNORMAL
EXT PANCREATIC POLYPEPTIDE: ABNORMAL
EXT PHOSPHORUS: ABNORMAL
EXT PLATELETS: 239 K/UL (ref 140–420)
EXT POTASSIUM: 3.5 MEQ/L (ref 3.6–5.1)
EXT PROGRAF LEVEL: ABNORMAL
EXT PROLACTIN: ABNORMAL
EXT PROTEIN TOTAL: ABNORMAL
EXT PROTEIN UA: ABNORMAL
EXT PT: ABNORMAL
EXT PTH, INTACT: ABNORMAL
EXT PTT: ABNORMAL
EXT RAPAMUNE LEVEL: ABNORMAL
EXT SEROTONIN: ABNORMAL
EXT SODIUM: 138 MEQ/L (ref 136–144)
EXT SOMATOSTATIN: ABNORMAL
EXT SUBSTANCE P: ABNORMAL
EXT TRIGLYCERIDES: 68 MG/DL (ref 0–200)
EXT TRYPTASE: ABNORMAL
EXT TSH: ABNORMAL
EXT URIC ACID: ABNORMAL
EXT URINE AMYLASE U/HR: ABNORMAL
EXT URINE AMYLASE U/L: ABNORMAL
EXT VASOACTIVE INTESTINAL POLYPEPTIDE: ABNORMAL
EXT VITAMIN B12: ABNORMAL
EXT VMA 24 HR URINE: ABNORMAL
EXT WBC: 5.8 K/UL (ref 4.8–10.8)
HCT: 37.6 % (ref 37–47)
NEURON SPECIFIC ENOLASE: ABNORMAL

## 2019-07-18 LAB
EXT 24 HR UR METANEPHRINE: ABNORMAL
EXT 24 HR UR NORMETANEPHRINE: ABNORMAL
EXT 24 HR UR NORMETANEPHRINE: ABNORMAL
EXT 25 HYDROXY VIT D2: ABNORMAL
EXT 25 HYDROXY VIT D3: ABNORMAL
EXT 5 HIAA 24 HR URINE: ABNORMAL
EXT 5 HIAA BLOOD: ABNORMAL
EXT ACTH: ABNORMAL
EXT AFP: ABNORMAL
EXT ALBUMIN: ABNORMAL
EXT ALKALINE PHOSPHATASE: ABNORMAL
EXT ALT: ABNORMAL
EXT AMYLASE: ABNORMAL
EXT ANTI ISLET CELL AB: ABNORMAL
EXT ANTI PARIETAL CELL AB: ABNORMAL
EXT ANTI THYROID AB: ABNORMAL
EXT AST: ABNORMAL
EXT BILIRUBIN DIRECT: ABNORMAL
EXT BILIRUBIN TOTAL: ABNORMAL
EXT BK VIRUS DNA QN PCR: ABNORMAL
EXT BUN: ABNORMAL
EXT C PEPTIDE: ABNORMAL
EXT CA 125: ABNORMAL
EXT CA 19-9: ABNORMAL
EXT CA 27-29: ABNORMAL
EXT CALCITONIN: ABNORMAL
EXT CALCIUM: ABNORMAL
EXT CEA: ABNORMAL
EXT CHLORIDE: ABNORMAL
EXT CHOLESTEROL: 244 MG/DL (ref 0–240)
EXT CHROMOGRANIN A: ABNORMAL
EXT CO2: ABNORMAL
EXT CREATININE UA: ABNORMAL
EXT CREATININE: ABNORMAL
EXT CYCLOSPORONE LEVEL: ABNORMAL
EXT DOPAMINE: ABNORMAL
EXT EBV DNA BY PCR: ABNORMAL
EXT EPINEPHRINE: ABNORMAL
EXT FOLATE: ABNORMAL
EXT FREE T3: ABNORMAL
EXT FREE T4: ABNORMAL
EXT FSH: ABNORMAL
EXT GASTRIN RELEASING PEPTIDE: ABNORMAL
EXT GASTRIN RELEASING PEPTIDE: ABNORMAL
EXT GASTRIN: ABNORMAL
EXT GGT: ABNORMAL
EXT GHRELIN: ABNORMAL
EXT GLUCAGON: ABNORMAL
EXT GLUCOSE: ABNORMAL
EXT GROWTH HORMONE: ABNORMAL
EXT HCV RNA QUANT PCR: ABNORMAL
EXT HDL: 60.3 MG/DL (ref 40–?)
EXT HEMATOCRIT: ABNORMAL
EXT HEMOGLOBIN A1C: 5.4 % (ref 4.2–5.8)
EXT HEMOGLOBIN: ABNORMAL
EXT HISTAMINE 24 HR URINE: ABNORMAL
EXT HISTAMINE: ABNORMAL
EXT IGF-1: ABNORMAL
EXT IMMUNKNOW (NON-STIMULATED): ABNORMAL
EXT IMMUNKNOW (STIMULATED): ABNORMAL
EXT INR: ABNORMAL
EXT INSULIN: ABNORMAL
EXT LANREOTIDE LEVEL: ABNORMAL
EXT LDH, TOTAL: ABNORMAL
EXT LDL CHOLESTEROL: 170 MG/DL (ref 0–100)
EXT LIPASE: ABNORMAL
EXT MAGNESIUM: ABNORMAL
EXT METANEPHRINE FREE PLASMA: ABNORMAL
EXT MOTILIN: ABNORMAL
EXT NEUROKININ A CAMB: ABNORMAL
EXT NEUROKININ A ISI: ABNORMAL
EXT NEUROTENSIN: ABNORMAL
EXT NOREPINEPHRINE: ABNORMAL
EXT NORMETANEPHRINE: ABNORMAL
EXT NSE: ABNORMAL
EXT OCTREOTIDE LEVEL: ABNORMAL
EXT PANCREASTATIN CAMB: ABNORMAL
EXT PANCREASTATIN ISI: ABNORMAL
EXT PANCREATIC POLYPEPTIDE: ABNORMAL
EXT PHOSPHORUS: ABNORMAL
EXT PLATELETS: ABNORMAL
EXT POTASSIUM: ABNORMAL
EXT PROGRAF LEVEL: ABNORMAL
EXT PROLACTIN: ABNORMAL
EXT PROTEIN TOTAL: ABNORMAL
EXT PROTEIN UA: ABNORMAL
EXT PT: ABNORMAL
EXT PTH, INTACT: ABNORMAL
EXT PTT: ABNORMAL
EXT RAPAMUNE LEVEL: ABNORMAL
EXT SEROTONIN: ABNORMAL
EXT SODIUM: ABNORMAL
EXT SOMATOSTATIN: ABNORMAL
EXT SUBSTANCE P: ABNORMAL
EXT TRIGLYCERIDES: 68 MG/DL (ref 0–200)
EXT TRYPTASE: ABNORMAL
EXT TSH: ABNORMAL
EXT URIC ACID: ABNORMAL
EXT URINE AMYLASE U/HR: ABNORMAL
EXT URINE AMYLASE U/L: ABNORMAL
EXT VASOACTIVE INTESTINAL POLYPEPTIDE: ABNORMAL
EXT VITAMIN B12: ABNORMAL
EXT VMA 24 HR URINE: ABNORMAL
EXT WBC: ABNORMAL
NEURON SPECIFIC ENOLASE: ABNORMAL

## 2019-07-22 ENCOUNTER — PATIENT MESSAGE (OUTPATIENT)
Dept: NEUROLOGY | Facility: HOSPITAL | Age: 49
End: 2019-07-22

## 2019-09-09 ENCOUNTER — PATIENT MESSAGE (OUTPATIENT)
Dept: NEUROLOGY | Facility: HOSPITAL | Age: 49
End: 2019-09-09

## 2019-11-14 ENCOUNTER — PATIENT MESSAGE (OUTPATIENT)
Dept: NEUROLOGY | Facility: HOSPITAL | Age: 49
End: 2019-11-14

## 2019-12-03 ENCOUNTER — TELEPHONE (OUTPATIENT)
Dept: NEUROLOGY | Facility: HOSPITAL | Age: 49
End: 2019-12-03

## 2019-12-03 ENCOUNTER — HOSPITAL ENCOUNTER (OUTPATIENT)
Dept: RADIOLOGY | Facility: HOSPITAL | Age: 49
Discharge: HOME | End: 2019-12-03
Attending: SURGERY
Payer: COMMERCIAL

## 2019-12-03 DIAGNOSIS — D49.89 NEOPLASM OF ABDOMEN: ICD-10-CM

## 2019-12-03 PROCEDURE — 74177 CT ABD & PELVIS W/CONTRAST: CPT | Mod: TC

## 2019-12-03 PROCEDURE — A9585 GADOBUTROL INJECTION: HCPCS | Performed by: SURGERY

## 2019-12-03 PROCEDURE — 25500020 PHARM REV CODE 255: Performed by: SURGERY

## 2019-12-03 PROCEDURE — 74177 CT ABDOMEN PELVIS WITH CONTRAST: ICD-10-PCS | Mod: 26,,, | Performed by: RADIOLOGY

## 2019-12-03 PROCEDURE — 74183 MRI ABD W/O CNTR FLWD CNTR: CPT | Mod: 26,,, | Performed by: RADIOLOGY

## 2019-12-03 PROCEDURE — 74183 MRI ABDOMEN W WO CONTRAST: ICD-10-PCS | Mod: 26,,, | Performed by: RADIOLOGY

## 2019-12-03 PROCEDURE — 74177 CT ABD & PELVIS W/CONTRAST: CPT | Mod: 26,,, | Performed by: RADIOLOGY

## 2019-12-03 PROCEDURE — 74183 MRI ABD W/O CNTR FLWD CNTR: CPT | Mod: TC

## 2019-12-03 RX ORDER — GADOBUTROL 604.72 MG/ML
10 INJECTION INTRAVENOUS
Status: COMPLETED | OUTPATIENT
Start: 2019-12-03 | End: 2019-12-03

## 2019-12-03 RX ADMIN — IOHEXOL 30 ML: 350 INJECTION, SOLUTION INTRAVENOUS at 09:12

## 2019-12-03 RX ADMIN — IOHEXOL 100 ML: 350 INJECTION, SOLUTION INTRAVENOUS at 11:12

## 2019-12-03 RX ADMIN — GADOBUTROL 10 ML: 604.72 INJECTION INTRAVENOUS at 12:12

## 2019-12-03 NOTE — TELEPHONE ENCOUNTER
----- Message from Inez Linn sent at 12/3/2019  4:28 PM CST -----  Contact: Rakesh MARTIN [phone] ex [phone]  MM----Rakesh MARTIN is requesting all of the pt's records be faxed over to  # [phone].    Please call and advise.

## 2020-01-02 ENCOUNTER — PATIENT MESSAGE (OUTPATIENT)
Dept: NEUROLOGY | Facility: HOSPITAL | Age: 50
End: 2020-01-02

## 2020-01-04 ENCOUNTER — LAB VISIT (OUTPATIENT)
Dept: LAB | Facility: HOSPITAL | Age: 50
End: 2020-01-04
Payer: COMMERCIAL

## 2020-01-04 DIAGNOSIS — C7A.026 CARCINOID TUMOR, RECTAL, MALIGNANT: Primary | ICD-10-CM

## 2020-01-04 LAB
ALBUMIN SERPL BCP-MCNC: 4.3 G/DL (ref 3.5–5.2)
ALP SERPL-CCNC: 126 U/L (ref 55–135)
ALT SERPL W/O P-5'-P-CCNC: 25 U/L (ref 10–44)
ANION GAP SERPL CALC-SCNC: 11 MMOL/L (ref 8–16)
AST SERPL-CCNC: 29 U/L (ref 10–40)
BASOPHILS # BLD AUTO: 0.05 K/UL (ref 0–0.2)
BASOPHILS NFR BLD: 0.8 % (ref 0–1.9)
BILIRUB SERPL-MCNC: 0.5 MG/DL (ref 0.1–1)
BUN SERPL-MCNC: 13 MG/DL (ref 6–20)
CALCIUM SERPL-MCNC: 10.1 MG/DL (ref 8.7–10.5)
CHLORIDE SERPL-SCNC: 107 MMOL/L (ref 95–110)
CO2 SERPL-SCNC: 25 MMOL/L (ref 23–29)
CREAT SERPL-MCNC: 0.8 MG/DL (ref 0.5–1.4)
DIFFERENTIAL METHOD: ABNORMAL
EOSINOPHIL # BLD AUTO: 0.1 K/UL (ref 0–0.5)
EOSINOPHIL NFR BLD: 1.9 % (ref 0–8)
ERYTHROCYTE [DISTWIDTH] IN BLOOD BY AUTOMATED COUNT: 12.5 % (ref 11.5–14.5)
EST. GFR  (AFRICAN AMERICAN): >60 ML/MIN/1.73 M^2
EST. GFR  (NON AFRICAN AMERICAN): >60 ML/MIN/1.73 M^2
GLUCOSE SERPL-MCNC: 87 MG/DL (ref 70–110)
HCT VFR BLD AUTO: 43.5 % (ref 37–48.5)
HGB BLD-MCNC: 13.6 G/DL (ref 12–16)
LYMPHOCYTES # BLD AUTO: 2.1 K/UL (ref 1–4.8)
LYMPHOCYTES NFR BLD: 36 % (ref 18–48)
MCH RBC QN AUTO: 29 PG (ref 27–31)
MCHC RBC AUTO-ENTMCNC: 31.3 G/DL (ref 32–36)
MCV RBC AUTO: 93 FL (ref 82–98)
MONOCYTES # BLD AUTO: 0.4 K/UL (ref 0.3–1)
MONOCYTES NFR BLD: 7.4 % (ref 4–15)
NEUTROPHILS # BLD AUTO: 3.2 K/UL (ref 1.8–7.7)
NEUTROPHILS NFR BLD: 53.7 % (ref 38–73)
NRBC BLD-RTO: 0 /100 WBC
PLATELET # BLD AUTO: 258 K/UL (ref 150–350)
PMV BLD AUTO: 10.8 FL (ref 9.2–12.9)
POTASSIUM SERPL-SCNC: 4 MMOL/L (ref 3.5–5.1)
PROT SERPL-MCNC: 8 G/DL (ref 6–8.4)
RBC # BLD AUTO: 4.69 M/UL (ref 4–5.4)
SODIUM SERPL-SCNC: 143 MMOL/L (ref 136–145)
WBC # BLD AUTO: 5.94 K/UL (ref 3.9–12.7)

## 2020-01-04 PROCEDURE — 85025 COMPLETE CBC W/AUTO DIFF WBC: CPT

## 2020-01-04 PROCEDURE — 84260 ASSAY OF SEROTONIN: CPT

## 2020-01-04 PROCEDURE — 83519 RIA NONANTIBODY: CPT | Mod: 59

## 2020-01-04 PROCEDURE — 82542 COL CHROMOTOGRAPHY QUAL/QUAN: CPT

## 2020-01-04 PROCEDURE — 83520 IMMUNOASSAY QUANT NOS NONAB: CPT

## 2020-01-04 PROCEDURE — 80053 COMPREHEN METABOLIC PANEL: CPT

## 2020-01-04 PROCEDURE — 36415 COLL VENOUS BLD VENIPUNCTURE: CPT

## 2020-01-04 PROCEDURE — 83519 RIA NONANTIBODY: CPT

## 2020-01-07 LAB — NSE SERPL-MCNC: 8.4 NG/ML

## 2020-01-07 NOTE — PROGRESS NOTES
" NOLANETS:  [facility]  A collaboration between [facility] and [facility]      PATIENT: Belkys Cote  MRN: [id number]  DATE: 1/9/2020    Subjective:      Chief Complaint: 6 month follow up for rectal neuroendocrine tumor.  Review most recent imaging and blood work results. Establish surveillance and treatment plan.     Overview / HPI: This nice lady has a rectal neuroendocrine tumor diagnosed in 2018. This was found on a colonoscopy and resected endoscopically.    Interval History:   She returns to clinic for scheduled follow-up to review the findings on the most recent imaging and blood work and to establish a surveillance program at this juncture.     Recent testing includes tumor markers (1/2020), Ct abd/pelvis (12/2019), MRI abd (12/2019), Ga 68 PET/CT scan (4/2018), echocardiogram (4/2018), rectal EUS (9/2019).    Vitals: Blood pressure 115/78, pulse 67, temperature 98.9 °F (37.2 °C), temperature source Oral, height 5' 5" (1.651 m), weight 111.8 kg (246 lb 7.6 oz). --stable    ECOG Score: 0 - Asymptomatic    Oncologic History:   Oncologic History Rectal net- dx /   Oncologic Treatment    Pathology 1/2018- rectal bx- well diff net, 0.6 cm, G1, Ki 67<1%, pT1a pNX     Past Medical History:  Past Medical History:   Diagnosis Date    Allergy     Anxiety     Rectal carcinoid tumor 01/2018       Past Surgical History:  Past Surgical History:   Procedure Laterality Date    ankle sugery   01/2016    CHOLECYSTECTOMY  01/2001    HYSTERECTOMY  01/2000    neck sugery  01/2008       Family History:  Family History   Problem Relation Age of Onset    Cancer Mother        Allergies:  Betadine [povidone-iodine]; Chlorhexidine; and Epinephrine    Medications:  Current Outpatient Medications   Medication Sig    aspirin 81 MG Chew Take 81 mg by mouth daily    CLEOCIN T 1 % lotion     fexofenadine-pseudoephedrine (ALLEGRA-D " 24) 180-240 mg per 24 hr tablet Take 1 tablet by mouth once daily.    fluticasone (FLONASE) 50 mcg/actuation nasal spray 1 spray by Nasal route once daily.     hydrocodone-acetaminophen 5-325mg (NORCO) 5-325 mg per tablet Take 1-2 tablets by mouth every 6 (six) hours as needed  for Pain    montelukast (SINGULAIR) 10 mg tablet Take 10 mg by mouth once daily.     ondansetron (ZOFRAN-ODT) 4 MG TbDL Take 8 mg by mouth every 12 (twelve) hours.    SUMAtriptan (IMITREX) 5 mg/actuation Spry     topiramate (TOPAMAX) 100 MG tablet Take 25 mg by mouth 2 (two) times daily.     tretinoin (RETIN-A) 0.025 % cream      No current facility-administered medications for this visit.         Review of Systems   Constitutional: Negative.    HENT: Negative.    Eyes: Negative.    Respiratory: Negative.    Cardiovascular: Negative.    Gastrointestinal: Positive for diarrhea.   Endocrine: Negative.    Genitourinary: Negative.    Musculoskeletal: Positive for back pain.   Skin: Negative.    Neurological: Positive for headaches.   Psychiatric/Behavioral: Positive for sleep disturbance.          Objective:      Physical Exam   Constitutional: She is oriented to person, place, and time. She appears well-developed and well-nourished. No distress.   HENT:   Head: Normocephalic.   Eyes: Pupils are equal, round, and reactive to light.   Neck: Normal range of motion.   Cardiovascular: Normal rate.   Pulmonary/Chest: No stridor. No respiratory distress.   Musculoskeletal: She exhibits no edema.   Neurological: She is alert and oriented to person, place, and time.   Skin: Skin is warm. She is not diaphoretic.   Psychiatric: She has a normal mood and affect. Her behavior is normal. Judgment and thought content normal.        Laboratory Data:    Tumor markers -PENDING 1/2020    Neuroendocrine Labs Latest Ref Rng & Units 5/13/2019 2/19/2019   EXT 5 HIAA BLOOD 0 - 22 ng/ml 8 12   EXT SEROTONIN 56 - 244 ng/ml 184    EXT PANCREASTATIN ATUL 10 - 135  pg/ml 56    EXT NEUROKININ A ATUL 0 - 40 pg/ml <10      Neuroendocrine Labs Latest Ref Rng & Units 1/16/2019   EXT 5 HIAA BLOOD 0 - 22 ng/ml 131 (A)   EXT SEROTONIN 56 - 244 ng/ml <20   EXT PANCREASTATIN ATUL 10 - 135 pg/ml 51   EXT NEUROKININ A ATUL 0 - 40 pg/ml      Neuroendocrine Labs Latest Ref Rng & Units 1/4/2020   WBC 3.90 - 12.70 K/uL 5.94   EXT WBC 3.8 - 10.8 1000/ul    HGB 12.0 - 16.0 g/dL 13.6   EXT HGB 11.7 - 15.5 g/dl    HCT 37.0 - 48.5 % 43.5   EXT HCT 35 - 45 %    PLATLETS 150 - 350 K/uL 258   EXT PLATLETS 140 - 400 1000/ul    GLUCOSE 70 - 110 mg/dL 87   EXT GLUCOSE 65 - 99 mg/dl    BUN 6 - 20 mg/dL 13   EXT BUN 7 - 25 mg/dl    CREATININE 0.5 - 1.4 mg/dL 0.8   EXT CREATININE 0.5 - 1.1 mg/dl     - 145 mmol/L 143   EXT  - 146 mmol/l    K 3.5 - 5.1 mmol/L 4.0   EXT K 3.5 - 5.3 mmol/l    CHLORIDE 95 - 110 mmol/L 107   EXT CHLORIDE 98 - 110 mmol/l    CO2 23 - 29 mmol/L 25   EXT CO2 20 - 32 mmol/l    CALCIUM 8.7 - 10.5 mg/dL 10.1   EXT CALCIUM 8.6 - 10.2 mg/dl    PROTEIN, TOTAL 6.0 - 8.4 g/dL 8.0   EXT PROTEIN, TOTAL 8.6 - 10.2 mg/dl    ALBUMIN 3.5 - 5.2 g/dL 4.3   EXT ALBUMIN 3.6 - 5.1 g/dl    TOTAL BILIRUBIN 0.1 - 1.0 mg/dL 0.5   EXT TOTAL BILIRUBIN 0.2 - 1.2 mg/dl    ALK PHOSPHATASE 55 - 135 U/L 126   EXT ALK PHOSPHATASE 33 - 115 u/l    SGOT (AST) 10 - 40 U/L 29   EXT SGOT (AST) 10 - 35 u/l    SGPT (ALT) 10 - 44 U/L 25     Scans:   MRI Abdomen W WO Contrast-12/3/19  Narrative: EXAMINATION:  MRI ABDOMEN W WO CONTRAST    CLINICAL HISTORY:  Neoplasm: abdomen, metastatic, recurrence, suspected/known;  Neoplasm of unspecified behavior of other specified sites    TECHNIQUE:  10 mL of Gadavist IV contrast was administered for today's examination.    FINDINGS:  The visualized portion of the base of the lungs is unremarkable.  The visualized portion of the heart, stomach, spleen, pancreas, and liver are unremarkable.  The gallbladder is absent.  The adrenal glands are unremarkable.  The kidneys are  unremarkable.  The visualized portion of the aorta is unremarkable.  The visualized loops of bowel are significant for diverticulosis coli.  There is no abnormal enhancement following the administration of contrast.  Impression: No acute findings with no detrimental interval change compared to previous MRI dated 04/16/2018.      CT Abdomen Pelvis With Contrast-12/3/19  Narrative: EXAMINATION:  CT ABDOMEN PELVIS WITH CONTRAST    CLINICAL HISTORY:  Neoplasm: abdomen, metastatic, recurrence, suspected/known; Neoplasm of unspecified behavior of other specified sites    TECHNIQUE:  Low dose axial images, sagittal and coronal reformations were obtained from the lung bases to the pubic symphysis following the IV administration of 100 mL of Omnipaque 350    FINDINGS:  The visualized portion of the base of the lungs is significant for bilateral dependent atelectatic versus fibrotic change.  The visualized portion of the heart, stomach, spleen, pancreas, liver, and adrenal glands are unremarkable.  The gallbladder is absent.  The right kidney is unremarkable.  The left kidney contains a 0.4 cm stone.  The kidneys appropriately concentrate and excrete contrast on delayed images.  The bladder is unremarkable.  The uterus is absent or atrophic.  There are bilateral adnexal hypodensities which are likely ovarian in origin.  The bowel is significant for diverticulosis coli.  The appendix has a normal appearance.  The osseous structures are significant for nonspecific foci of sclerosis within T10 and T12 unchanged as far back as CT dated 11/19/2017.  Impression: No acute findings with no detrimental interval change compared to previous performed 05/13/2019.    Stable foci of sclerosis within T10 and T12 unchanged as far back as CT dated 11/19/2017.    Diverticulosis coli.    Left-sided renal stone.       NM PET 68GA DOTATATE WHOLE BODY-4/13/18    CLINICAL HISTORY:  Benign carcinoid tumor of the rectum    TECHNIQUE:  5.25 of GA68  Dotatate was administered intravenously in the right antecubital fossa. After an approximately 60 min distribution time, PET/CT images were acquired from the skull vertex to the mid thigh. Transmission images were acquired to correct for attenuation using a whole body low-dose CT scan without contrast with the arms positioned above the head.    COMPARISON:  None    FINDINGS:  Quality of the study: Adequate.    No abnormal foci of increased tracer uptake are present.    Physiologic uptake of the tracer is seen within the pituitary, liver, spleen, kidneys, adrenal glands and bowel.    Incidental CT findings: Sclerotic focus involving left, posterior aspect of T10 vertebral body has no associated abnormal tracer uptake.  Nonobstructing 3-4 mm stones are present in the left kidney.  Lower cervical spine fusion hardware is present.  Status post cholecystectomy.      Impression       No PET/CT findings to suggest somatostatin receptor avid disease.    Sclerotic focus involving the left posterior aspect of the T10 vertebral body without associated abnormal tracer uptake.  This could represent an area of treated disease.  Correlation with prior imaging is recommended.       Echocardiogram- 4/16/18  CONCLUSIONS     1 - Normal left ventricular systolic function (EF 60-65%).     2 - Normal left ventricular diastolic function.     3 - Normal right ventricular systolic function .       Rectal EUS- 9/9/19  RECTAL BIOPSY:  - MILD CHRONIC INFLAMMATION.                                   MICROSCOPIC EXAMINATION:  Sections show a fragment of rectal mucosa with mild nonspecific chronic  inflammation.  There is no evidence of a carcinoid tumor.                                                                          SPECIMEN AND SOURCE:  Rectum scar (history of carcinoid)                                     Assessment/Impression:         Problem List Items Addressed This Visit        Oncology    Malignant carcinoid tumor of the rectum -  Primary    Relevant Orders    Serotonin serum    Pancreastatin           Plan:         I had a long conversation with the patient about the features of her case that support the treatment and surveillance recommendations outlined below:  1.  She had a less than 1 cm, grade 1, well-differentiated, rectal carcinoid endoscopically removed in 2018.  The etiology of rectal carcinoids is unknown.  The most effective method to survey for the development of local recurrence or additional tumor formation within the remnant rectum is a lower endoscopy.  I explained that the indications for doing a colonoscopy in this age group versus a limited sigmoidoscopy is based on ones other medical conditions or the risk of colon cancer versus the surveillance of a neuroendocrine tumor at this site. The recommendation would be annual sigmoidoscopy so for the surveillance of neuroendocrine.  We do this up to 5 years at which time we re-evaluate the risk benefit of future surveillance depending on what has been found on endoscopy.  2.  She presented with diarrhea of unclear etiology.  She underwent a colonoscopy for this indication and it was an incidental finding of a rectal carcinoid.  Her diarrhea did not change with resection of the carcinoid and therefore we have not really addressed the symptoms that brought her to medical attention to begin with.  We discussed some of the under recognized causes of diarrhea that include food intolerance is like lactose intolerance or gluten intolerance.  Pancreatic insufficiency can cause a degree of a malabsorption that can manifest as diarrhea.  All of these things are easily valuable by a gastroenterologist with either endoscopy or stool studies.  We did discuss some dietary behavior modification that might also help clarify up for her over the next several weeks if she was willing to alter her diet and eating patterns and keep a log with respect to how any changes affect her diarrhea.  3.  She  had a CT scan the abdomen pelvis that shows no evidence of andrew involvement or distant metastases.  This would be highly unusual in a less than 1 cm low-grade rectal primary.  When done under the circumstances, the cross-sectional imaging is more likely to evaluate for other medical conditions or findings for which than the scan would be used as a comparison to future scans done for other reasons.  For example she has some cystic changes within her ovaries that are not uncommon for a woman in this age group an are unrelated to neuroendocrine.  However were she to develop abnormal spotting and in the context of postmenopausal status, changes within the ovaries could be interpreted differently.  I would hold on future cross-sectional imaging for this indication.    Sigmoidoscopy in 1 year  Neuroendocrine blood work in 1 year  Consider further GI evaluation for diarrhea  Suggested decreasing her gluten intake and keeping a log.    Cristina Crowley MD, MPH, FACS  Professor of Surgery, [facility]  Neuroendocrine Surgery, Hepatic/Pancreatic & General Surgical Oncology  [address]  [place]  [address]  ph. [phone]; [phone]  fax. [phone]

## 2020-01-09 ENCOUNTER — OFFICE VISIT (OUTPATIENT)
Dept: NEUROLOGY | Facility: HOSPITAL | Age: 50
End: 2020-01-09
Attending: SURGERY
Payer: COMMERCIAL

## 2020-01-09 VITALS
DIASTOLIC BLOOD PRESSURE: 78 MMHG | WEIGHT: 246.5 LBS | SYSTOLIC BLOOD PRESSURE: 115 MMHG | TEMPERATURE: 99 F | HEART RATE: 67 BPM | BODY MASS INDEX: 41.07 KG/M2 | HEIGHT: 65 IN

## 2020-01-09 DIAGNOSIS — C7A.026 MALIGNANT CARCINOID TUMOR OF THE RECTUM: Primary | ICD-10-CM

## 2020-01-09 LAB — SEROTONIN: 194 NG/ML

## 2020-01-09 PROCEDURE — 99214 OFFICE O/P EST MOD 30 MIN: CPT | Performed by: SURGERY

## 2020-01-09 RX ORDER — CLINDAMYCIN PHOSPHATE 1 %
LOTION (ML) TOPICAL
COMMUNITY
Start: 2019-11-25

## 2020-01-09 RX ORDER — TRETINOIN 0.25 MG/G
CREAM TOPICAL
COMMUNITY
Start: 2019-11-25

## 2020-01-09 RX ORDER — SUMATRIPTAN 5 MG/1
SPRAY NASAL
COMMUNITY
Start: 2019-11-25

## 2020-01-09 NOTE — PATIENT INSTRUCTIONS
Blood work / Lab work / Tumor markers: due every 12 mos.  --- December 2020  Get lab work drawn at least 1 month prior to appointment.    Scans: hold on cross sectional imaging at this time.      Return Clinic Appointment: in 12 months with Dr. Crowley - appointment made    Flex Sig:  due in 12 months --- due September 2020  Orders given to patient.    Notes From Physician:   Consider further GI evaluation for diarrhea  Suggested decreasing her gluten intake and keeping a log.

## 2020-01-14 ENCOUNTER — PATIENT MESSAGE (OUTPATIENT)
Dept: NEUROLOGY | Facility: HOSPITAL | Age: 50
End: 2020-01-14

## 2020-01-20 LAB — 5-HIAA, PLASMA (NEUROEND): 7 NG/ML (ref 0–22)

## 2020-02-10 LAB
PANCREASTATIN SERPL-MCNC: 39 PG/ML (ref 10–135)
SUBSTANCE P, P/S: 880 PG/ML

## 2020-12-10 ENCOUNTER — PATIENT MESSAGE (OUTPATIENT)
Dept: NEUROLOGY | Facility: HOSPITAL | Age: 50
End: 2020-12-10

## 2020-12-12 ENCOUNTER — PATIENT MESSAGE (OUTPATIENT)
Dept: NEUROLOGY | Facility: HOSPITAL | Age: 50
End: 2020-12-12

## 2020-12-14 ENCOUNTER — PATIENT MESSAGE (OUTPATIENT)
Dept: NEUROLOGY | Facility: HOSPITAL | Age: 50
End: 2020-12-14

## 2020-12-15 ENCOUNTER — PATIENT MESSAGE (OUTPATIENT)
Dept: NEUROLOGY | Facility: HOSPITAL | Age: 50
End: 2020-12-15

## 2020-12-15 ENCOUNTER — TELEPHONE (OUTPATIENT)
Dept: NEUROLOGY | Facility: HOSPITAL | Age: 50
End: 2020-12-15

## 2020-12-15 DIAGNOSIS — C7A.026 MALIGNANT CARCINOID TUMOR OF THE RECTUM: Primary | ICD-10-CM

## 2020-12-17 ENCOUNTER — LAB VISIT (OUTPATIENT)
Dept: LAB | Facility: HOSPITAL | Age: 50
End: 2020-12-17
Attending: SURGERY
Payer: COMMERCIAL

## 2020-12-17 DIAGNOSIS — C7A.026 MALIGNANT CARCINOID TUMOR OF THE RECTUM: ICD-10-CM

## 2020-12-17 LAB
ALBUMIN SERPL BCP-MCNC: 4 G/DL (ref 3.5–5.2)
ALP SERPL-CCNC: 116 U/L (ref 55–135)
ALT SERPL W/O P-5'-P-CCNC: 23 U/L (ref 10–44)
ANION GAP SERPL CALC-SCNC: 9 MMOL/L (ref 8–16)
AST SERPL-CCNC: 32 U/L (ref 10–40)
BILIRUB SERPL-MCNC: 0.3 MG/DL (ref 0.1–1)
BUN SERPL-MCNC: 13 MG/DL (ref 6–20)
CALCIUM SERPL-MCNC: 9.3 MG/DL (ref 8.7–10.5)
CHLORIDE SERPL-SCNC: 107 MMOL/L (ref 95–110)
CO2 SERPL-SCNC: 24 MMOL/L (ref 23–29)
CREAT SERPL-MCNC: 0.9 MG/DL (ref 0.5–1.4)
EST. GFR  (AFRICAN AMERICAN): >60 ML/MIN/1.73 M^2
EST. GFR  (NON AFRICAN AMERICAN): >60 ML/MIN/1.73 M^2
GLUCOSE SERPL-MCNC: 103 MG/DL (ref 70–110)
POTASSIUM SERPL-SCNC: 3.9 MMOL/L (ref 3.5–5.1)
PROT SERPL-MCNC: 7.4 G/DL (ref 6–8.4)
SODIUM SERPL-SCNC: 140 MMOL/L (ref 136–145)

## 2020-12-17 PROCEDURE — 84260 ASSAY OF SEROTONIN: CPT

## 2020-12-17 PROCEDURE — 83519 RIA NONANTIBODY: CPT

## 2020-12-17 PROCEDURE — 82542 COL CHROMOTOGRAPHY QUAL/QUAN: CPT

## 2020-12-17 PROCEDURE — 86316 IMMUNOASSAY TUMOR OTHER: CPT

## 2020-12-17 PROCEDURE — 80053 COMPREHEN METABOLIC PANEL: CPT

## 2020-12-17 PROCEDURE — 36415 COLL VENOUS BLD VENIPUNCTURE: CPT

## 2020-12-22 LAB — SEROTONIN: 133 NG/ML

## 2021-01-05 LAB — 5OH-INDOLEACETATE SERPL-MCNC: 9 NG/ML

## 2021-01-06 LAB
PANCREASTATIN SERPL-MCNC: 62 PG/ML (ref 10–135)
SUBSTANCE P, P/S: 562 PG/ML

## 2021-01-07 ENCOUNTER — OFFICE VISIT (OUTPATIENT)
Dept: NEUROLOGY | Facility: HOSPITAL | Age: 51
End: 2021-01-07
Attending: SURGERY
Payer: OTHER GOVERNMENT

## 2021-01-07 VITALS
SYSTOLIC BLOOD PRESSURE: 119 MMHG | BODY MASS INDEX: 38.42 KG/M2 | TEMPERATURE: 97 F | WEIGHT: 230.63 LBS | HEART RATE: 80 BPM | HEIGHT: 65 IN | DIASTOLIC BLOOD PRESSURE: 85 MMHG

## 2021-01-07 DIAGNOSIS — C7A.026 MALIGNANT CARCINOID TUMOR OF THE RECTUM: Primary | ICD-10-CM

## 2021-01-07 PROCEDURE — 99214 OFFICE O/P EST MOD 30 MIN: CPT | Performed by: SURGERY

## 2021-01-07 RX ORDER — COLESEVELAM 180 1/1
1875 TABLET ORAL 2 TIMES DAILY WITH MEALS
Qty: 540 TABLET | Refills: 3 | Status: SHIPPED | OUTPATIENT
Start: 2021-01-07 | End: 2022-01-07

## 2021-01-07 RX ORDER — DICLOFENAC SODIUM 10 MG/G
GEL TOPICAL
COMMUNITY
Start: 2020-12-22

## 2021-01-07 RX ORDER — METOCLOPRAMIDE 10 MG/1
10 TABLET ORAL
Qty: 120 TABLET | Refills: 0 | Status: SHIPPED | OUTPATIENT
Start: 2021-01-07 | End: 2021-02-06

## 2021-01-07 RX ORDER — HYDROCORTISONE/PRAMOXINE 1 %-1 %
CREAM WITH APPLICATOR RECTAL
COMMUNITY
Start: 2020-10-01

## 2021-01-07 RX ORDER — DIVALPROEX SODIUM 250 MG/1
TABLET, FILM COATED, EXTENDED RELEASE ORAL
COMMUNITY
Start: 2020-12-22

## 2021-01-07 RX ORDER — OMEGA-3 FATTY ACIDS 1000 MG
2 CAPSULE ORAL
COMMUNITY

## 2021-01-07 RX ORDER — CETIRIZINE HYDROCHLORIDE 10 MG/1
TABLET ORAL
COMMUNITY
Start: 2020-12-22

## 2021-01-07 RX ORDER — CHOLESTYRAMINE 4 G/5.5G
POWDER, FOR SUSPENSION ORAL
COMMUNITY
Start: 2020-12-22 | End: 2021-01-07 | Stop reason: SDUPTHER

## 2021-01-19 ENCOUNTER — PATIENT MESSAGE (OUTPATIENT)
Dept: NEUROLOGY | Facility: HOSPITAL | Age: 51
End: 2021-01-19

## 2021-02-24 ENCOUNTER — PATIENT MESSAGE (OUTPATIENT)
Dept: NEUROLOGY | Facility: HOSPITAL | Age: 51
End: 2021-02-24

## 2021-02-25 ENCOUNTER — TELEPHONE (OUTPATIENT)
Dept: NEUROLOGY | Facility: HOSPITAL | Age: 51
End: 2021-02-25

## 2021-02-25 DIAGNOSIS — C7A.026 MALIGNANT CARCINOID TUMOR OF THE RECTUM: Primary | ICD-10-CM

## 2021-04-06 ENCOUNTER — PATIENT MESSAGE (OUTPATIENT)
Dept: NEUROLOGY | Facility: HOSPITAL | Age: 51
End: 2021-04-06

## 2021-05-12 ENCOUNTER — PATIENT MESSAGE (OUTPATIENT)
Dept: RESEARCH | Facility: HOSPITAL | Age: 51
End: 2021-05-12

## 2021-09-15 ENCOUNTER — TELEPHONE (OUTPATIENT)
Dept: NEUROLOGY | Facility: HOSPITAL | Age: 51
End: 2021-09-15

## 2022-01-29 ENCOUNTER — LAB VISIT (OUTPATIENT)
Dept: LAB | Facility: HOSPITAL | Age: 52
End: 2022-01-29
Attending: SURGERY
Payer: OTHER GOVERNMENT

## 2022-01-29 DIAGNOSIS — C7A.026 MALIGNANT CARCINOID TUMOR OF THE RECTUM: ICD-10-CM

## 2022-01-29 LAB
ALBUMIN SERPL BCP-MCNC: 3.9 G/DL (ref 3.5–5.2)
ALP SERPL-CCNC: 99 U/L (ref 55–135)
ALT SERPL W/O P-5'-P-CCNC: 17 U/L (ref 10–44)
ANION GAP SERPL CALC-SCNC: 7 MMOL/L (ref 8–16)
AST SERPL-CCNC: 24 U/L (ref 10–40)
BILIRUB SERPL-MCNC: 0.5 MG/DL (ref 0.1–1)
BUN SERPL-MCNC: 13 MG/DL (ref 6–20)
CALCIUM SERPL-MCNC: 9.7 MG/DL (ref 8.7–10.5)
CHLORIDE SERPL-SCNC: 105 MMOL/L (ref 95–110)
CO2 SERPL-SCNC: 27 MMOL/L (ref 23–29)
CREAT SERPL-MCNC: 0.8 MG/DL (ref 0.5–1.4)
EST. GFR  (AFRICAN AMERICAN): >60 ML/MIN/1.73 M^2
EST. GFR  (NON AFRICAN AMERICAN): >60 ML/MIN/1.73 M^2
GLUCOSE SERPL-MCNC: 79 MG/DL (ref 70–110)
POTASSIUM SERPL-SCNC: 4 MMOL/L (ref 3.5–5.1)
PROT SERPL-MCNC: 7.2 G/DL (ref 6–8.4)
SODIUM SERPL-SCNC: 139 MMOL/L (ref 136–145)

## 2022-01-29 PROCEDURE — 83519 RIA NONANTIBODY: CPT | Mod: 59 | Performed by: SURGERY

## 2022-01-29 PROCEDURE — 83519 RIA NONANTIBODY: CPT | Performed by: SURGERY

## 2022-01-29 PROCEDURE — 80053 COMPREHEN METABOLIC PANEL: CPT | Performed by: SURGERY

## 2022-01-29 PROCEDURE — 82542 COL CHROMOTOGRAPHY QUAL/QUAN: CPT | Performed by: SURGERY

## 2022-01-29 PROCEDURE — 84260 ASSAY OF SEROTONIN: CPT | Performed by: SURGERY

## 2022-01-29 PROCEDURE — 36415 COLL VENOUS BLD VENIPUNCTURE: CPT | Performed by: SURGERY

## 2022-02-03 LAB — SEROTONIN: 140 NG/ML

## 2022-02-10 LAB — PANCREASTATIN SERPL-MCNC: 52 PG/ML (ref 10–135)

## 2022-02-14 LAB — SUBSTANCE P, P/S: 214 PG/ML

## 2022-02-16 LAB — 5OH-INDOLEACETATE SERPL-MCNC: 113 NG/ML

## 2022-02-21 ENCOUNTER — OFFICE VISIT (OUTPATIENT)
Dept: NEUROLOGY | Facility: HOSPITAL | Age: 52
End: 2022-02-21
Attending: SURGERY
Payer: OTHER GOVERNMENT

## 2022-02-21 VITALS
BODY MASS INDEX: 36.46 KG/M2 | SYSTOLIC BLOOD PRESSURE: 120 MMHG | HEIGHT: 65 IN | WEIGHT: 218.81 LBS | TEMPERATURE: 98 F | RESPIRATION RATE: 18 BRPM | HEART RATE: 76 BPM | DIASTOLIC BLOOD PRESSURE: 81 MMHG

## 2022-02-21 DIAGNOSIS — C7A.026 MALIGNANT CARCINOID TUMOR OF THE RECTUM: Primary | ICD-10-CM

## 2022-02-21 PROCEDURE — 99215 OFFICE O/P EST HI 40 MIN: CPT | Performed by: SURGERY

## 2022-02-21 NOTE — PROGRESS NOTES
" NOLANETS:  [facility]  A collaboration between [facility] and [facility]      PATIENT: Belkys Cote  MRN: [id number]  DATE: 2/21/2022    Subjective:      Chief Complaint: 6 month follow up for rectal neuroendocrine tumor.       Overview / HPI: This nice lady has a rectal neuroendocrine tumor diagnosed in 2018. This was found on a colonoscopy and resected endoscopically.    Interval History:     Sigmoidoscopy 8/2020      Vitals: Blood pressure 120/81, pulse 76, temperature 97.9 °F (36.6 °C), temperature source Oral, resp. rate 18, height 5' 5" (1.651 m), weight 99.2 kg (218 lb 12.9 oz). --stable    ECOG Score: 0 - Asymptomatic    Oncologic History:   Oncologic History Rectal net- dx /   Oncologic Treatment    Pathology 1/2018- rectal bx- well diff net, 0.6 cm, G1, Ki 67<1%, pT1a pNX     Past Medical History:  Past Medical History:   Diagnosis Date    Allergy     Anxiety     Rectal carcinoid tumor 01/2018       Past Surgical History:  Past Surgical History:   Procedure Laterality Date    ankle sugery   01/2016    CHOLECYSTECTOMY  01/2001    HYSTERECTOMY  01/2000    neck sugery  01/2008       Family History:  Family History   Problem Relation Age of Onset    Cancer Mother        Allergies:  Chlorhexidine, Povidone-iodine, and Epinephrine    Medications:  Current Outpatient Medications   Medication Sig    cetirizine (ZYRTEC) 10 MG tablet     CLEOCIN T 1 % lotion     diclofenac sodium (VOLTAREN) 1 % Gel     divalproex ER (DEPAKOTE ER) 250 MG 24 hr tablet     erenumab-aooe (AIMOVIG) 70 mg/mL autoinjector Inject 70 mg into the skin.    fexofenadine-pseudoephedrine (ALLEGRA-D 24) 180-240 mg per 24 hr tablet Take 1 tablet by mouth once daily.    fluticasone (FLONASE) 50 mcg/actuation nasal spray 1 spray by Nasal route once daily.     hydrocodone-acetaminophen 5-325mg (NORCO) 5-325 mg per tablet Take 1-2 tablets " by mouth every 6 (six) hours as needed  for Pain    mometasone (ASMANEX TWISTHALER) 220 mcg/ actuation (60) AePB INHALE 1 PUFF BY MOUTH TWICE A DAY FOR BREATHING. RINSE MOUTH AFTER USE.    montelukast (SINGULAIR) 10 mg tablet Take 10 mg by mouth once daily.     omega-3 fatty acids 1,000 mg Cap Take 2 g by mouth.    ondansetron (ZOFRAN-ODT) 4 MG TbDL Take 8 mg by mouth every 12 (twelve) hours.    tretinoin (RETIN-A) 0.025 % cream     ANALPRAM-HC 1-1 % rectal cream     aspirin 81 MG Chew Take 81 mg by mouth daily    colesevelam (WELCHOL) 625 mg tablet Take 3 tablets (1,875 mg total) by mouth 2 (two) times daily with meals.    SUMAtriptan (IMITREX) 5 mg/actuation Spry     topiramate (TOPAMAX) 100 MG tablet Take 25 mg by mouth 2 (two) times daily.      No current facility-administered medications for this visit.        Review of Systems   Constitutional: Negative for fever and unexpected weight change.   HENT: Negative for facial swelling and hearing loss.    Eyes: Negative for photophobia and visual disturbance.   Respiratory: Negative for shortness of breath and stridor.    Cardiovascular: Negative for palpitations.   Gastrointestinal: Positive for diarrhea.   Endocrine: Negative for cold intolerance.   Genitourinary: Negative for difficulty urinating and dysuria.   Musculoskeletal: Positive for back pain.   Skin: Negative for rash and wound.   Allergic/Immunologic: Negative for immunocompromised state.   Neurological: Positive for headaches.   Hematological: Does not bruise/bleed easily.   Psychiatric/Behavioral: Positive for sleep disturbance.          Objective:      Physical Exam  Constitutional:       General: She is not in acute distress.     Appearance: She is well-developed and normal weight. She is not ill-appearing, toxic-appearing or diaphoretic.   HENT:      Head: Normocephalic.   Eyes:      Pupils: Pupils are equal, round, and reactive to light.   Cardiovascular:      Rate and Rhythm: Normal  rate.   Pulmonary:      Effort: No respiratory distress.      Breath sounds: No stridor.   Musculoskeletal:      Cervical back: Normal range of motion.   Skin:     General: Skin is warm.   Neurological:      Mental Status: She is alert and oriented to person, place, and time.   Psychiatric:         Behavior: Behavior normal.         Thought Content: Thought content normal.         Judgment: Judgment normal.          Laboratory Data  Neuroendocrine Labs Latest Ref Rng & Units 12/17/2020   5 HIAA BLOOD Up to 22 ng/mL 9   EXT 5 HIAA BLOOD 0 - 22 ng/ml    SEROTONIN <=230 ng/mL 133   EXT SEROTONIN 56 - 244 ng/ml    PANCREASTATIN 10 - 135 pg/mL 62     Neuroendocrine Labs Latest Ref Rng & Units 1/4/2020   5 HIAA BLOOD Up to 22 ng/mL 7   EXT 5 HIAA BLOOD 0 - 22 ng/ml    SEROTONIN <=230 ng/mL 194   EXT SEROTONIN 56 - 244 ng/ml    PANCREASTATIN 10 - 135 pg/mL 39       Neuroendocrine Labs Latest Ref Rng & Units 5/13/2019 2/19/2019   EXT 5 HIAA BLOOD 0 - 22 ng/ml 8 12   EXT SEROTONIN 56 - 244 ng/ml 184    EXT PANCREASTATIN ATUL 10 - 135 pg/ml 56    EXT NEUROKININ A ATUL 0 - 40 pg/ml <10      Neuroendocrine Labs Latest Ref Rng & Units 1/16/2019   EXT 5 HIAA BLOOD 0 - 22 ng/ml 131 (A)   EXT SEROTONIN 56 - 244 ng/ml <20   EXT PANCREASTATIN ATUL 10 - 135 pg/ml 51   EXT NEUROKININ A ATUL 0 - 40 pg/ml      Neuroendocrine Labs Latest Ref Rng & Units 1/4/2020   WBC 3.90 - 12.70 K/uL 5.94   EXT WBC 3.8 - 10.8 1000/ul    HGB 12.0 - 16.0 g/dL 13.6   EXT HGB 11.7 - 15.5 g/dl    HCT 37.0 - 48.5 % 43.5   EXT HCT 35 - 45 %    PLATLETS 150 - 350 K/uL 258   EXT PLATLETS 140 - 400 1000/ul    GLUCOSE 70 - 110 mg/dL 87   EXT GLUCOSE 65 - 99 mg/dl    BUN 6 - 20 mg/dL 13   EXT BUN 7 - 25 mg/dl    CREATININE 0.5 - 1.4 mg/dL 0.8   EXT CREATININE 0.5 - 1.1 mg/dl     - 145 mmol/L 143   EXT  - 146 mmol/l    K 3.5 - 5.1 mmol/L 4.0   EXT K 3.5 - 5.3 mmol/l    CHLORIDE 95 - 110 mmol/L 107   EXT CHLORIDE 98 - 110 mmol/l    CO2 23 - 29 mmol/L 25   EXT  CO2 20 - 32 mmol/l    CALCIUM 8.7 - 10.5 mg/dL 10.1   EXT CALCIUM 8.6 - 10.2 mg/dl    PROTEIN, TOTAL 6.0 - 8.4 g/dL 8.0   EXT PROTEIN, TOTAL 8.6 - 10.2 mg/dl    ALBUMIN 3.5 - 5.2 g/dL 4.3   EXT ALBUMIN 3.6 - 5.1 g/dl    TOTAL BILIRUBIN 0.1 - 1.0 mg/dL 0.5   EXT TOTAL BILIRUBIN 0.2 - 1.2 mg/dl    ALK PHOSPHATASE 55 - 135 U/L 126   EXT ALK PHOSPHATASE 33 - 115 u/l    SGOT (AST) 10 - 40 U/L 29   EXT SGOT (AST) 10 - 35 u/l    SGPT (ALT) 10 - 44 U/L 25     Scans:   MRI Abdomen W WO Contrast-12/3/19  Narrative: EXAMINATION:  MRI ABDOMEN W WO CONTRAST    CLINICAL HISTORY:  Neoplasm: abdomen, metastatic, recurrence, suspected/known;  Neoplasm of unspecified behavior of other specified sites    TECHNIQUE:  10 mL of Gadavist IV contrast was administered for today's examination.    FINDINGS:  The visualized portion of the base of the lungs is unremarkable.  The visualized portion of the heart, stomach, spleen, pancreas, and liver are unremarkable.  The gallbladder is absent.  The adrenal glands are unremarkable.  The kidneys are unremarkable.  The visualized portion of the aorta is unremarkable.  The visualized loops of bowel are significant for diverticulosis coli.  There is no abnormal enhancement following the administration of contrast.  Impression: No acute findings with no detrimental interval change compared to previous MRI dated 04/16/2018.      CT Abdomen Pelvis With Contrast-12/3/19  Narrative: EXAMINATION:  CT ABDOMEN PELVIS WITH CONTRAST    CLINICAL HISTORY:  Neoplasm: abdomen, metastatic, recurrence, suspected/known; Neoplasm of unspecified behavior of other specified sites    TECHNIQUE:  Low dose axial images, sagittal and coronal reformations were obtained from the lung bases to the pubic symphysis following the IV administration of 100 mL of Omnipaque 350    FINDINGS:  The visualized portion of the base of the lungs is significant for bilateral dependent atelectatic versus fibrotic change.  The visualized  portion of the heart, stomach, spleen, pancreas, liver, and adrenal glands are unremarkable.  The gallbladder is absent.  The right kidney is unremarkable.  The left kidney contains a 0.4 cm stone.  The kidneys appropriately concentrate and excrete contrast on delayed images.  The bladder is unremarkable.  The uterus is absent or atrophic.  There are bilateral adnexal hypodensities which are likely ovarian in origin.  The bowel is significant for diverticulosis coli.  The appendix has a normal appearance.  The osseous structures are significant for nonspecific foci of sclerosis within T10 and T12 unchanged as far back as CT dated 11/19/2017.  Impression: No acute findings with no detrimental interval change compared to previous performed 05/13/2019.    Stable foci of sclerosis within T10 and T12 unchanged as far back as CT dated 11/19/2017.    Diverticulosis coli.    Left-sided renal stone.       NM PET 68GA DOTATATE WHOLE BODY-4/13/18    CLINICAL HISTORY:  Benign carcinoid tumor of the rectum    TECHNIQUE:  5.25 of GA68 Dotatate was administered intravenously in the right antecubital fossa. After an approximately 60 min distribution time, PET/CT images were acquired from the skull vertex to the mid thigh. Transmission images were acquired to correct for attenuation using a whole body low-dose CT scan without contrast with the arms positioned above the head.    COMPARISON:  None    FINDINGS:  Quality of the study: Adequate.    No abnormal foci of increased tracer uptake are present.    Physiologic uptake of the tracer is seen within the pituitary, liver, spleen, kidneys, adrenal glands and bowel.    Incidental CT findings: Sclerotic focus involving left, posterior aspect of T10 vertebral body has no associated abnormal tracer uptake.  Nonobstructing 3-4 mm stones are present in the left kidney.  Lower cervical spine fusion hardware is present.  Status post cholecystectomy.      Impression       No PET/CT findings to  suggest somatostatin receptor avid disease.    Sclerotic focus involving the left posterior aspect of the T10 vertebral body without associated abnormal tracer uptake.  This could represent an area of treated disease.  Correlation with prior imaging is recommended.       Echocardiogram- 4/16/18  CONCLUSIONS     1 - Normal left ventricular systolic function (EF 60-65%).     2 - Normal left ventricular diastolic function.     3 - Normal right ventricular systolic function .     EUS 10/2021      Rectal EUS- 9/9/19  RECTAL BIOPSY:  - MILD CHRONIC INFLAMMATION.                                   MICROSCOPIC EXAMINATION:  Sections show a fragment of rectal mucosa with mild nonspecific chronic  inflammation.  There is no evidence of a carcinoid tumor.                                                                          SPECIMEN AND SOURCE:  Rectum scar (history of carcinoid)                                     Assessment/Impression:         Problem List Items Addressed This Visit    None          Plan:         I had a long conversation with the patient about the features of her case that support the treatment and surveillance recommendations outlined below:  1.  She had a less than 1 cm, grade 1, well-differentiated, rectal carcinoid endoscopically removed in 2018.  The etiology of rectal carcinoids is unknown.  The most effective method to survey for the development of local recurrence or additional tumor formation within the remnant rectum is a lower endoscopy.  I explained that the indications for doing a colonoscopy in this age group versus a limited sigmoidoscopy is based on ones other medical conditions or the risk of colon cancer versus the surveillance of a neuroendocrine tumor at this site. The recommendation would be annual sigmoidoscopy  for the surveillance of neuroendocrine.  We do this up to 5 years at which time we re-evaluate the risk benefit of future surveillance depending on what has been found on  endoscopy.  2.  She presented with diarrhea of unclear etiology.  She underwent a colonoscopy for this indication and it was an incidental finding of a rectal carcinoid.  Her diarrhea did not change with resection of the carcinoid and therefore we have not really addressed the symptoms that brought her to medical attention to begin with.  We discussed some of the under recognized causes of diarrhea that include food intolerance is like lactose intolerance or gluten intolerance.  Pancreatic insufficiency can cause a degree of a malabsorption that can manifest as diarrhea.  All of these things are easily valuable by a gastroenterologist with either endoscopy or stool studies.  We did discuss some dietary behavior modification that might also help clarify up for her over the next several weeks if she was willing to alter her diet and eating patterns and keep a log with respect to how any changes affect her diarrhea.  3.  She had a CT scan the abdomen pelvis that shows no evidence of andrew involvement or distant metastases.  This would be highly unusual in a less than 1 cm low-grade rectal primary.  When done under the circumstances, the cross-sectional imaging is more likely to evaluate for other medical conditions or findings for which than the scan would be used as a comparison to future scans done for other reasons.  For example she has some cystic changes within her ovaries that are not uncommon for a woman in this age group an are unrelated to neuroendocrine.  However were she to develop abnormal spotting and in the context of postmenopausal status, changes within the ovaries could be interpreted differently.  I would hold on future cross-sectional imaging for this indication.  4. She had a sigmoidoscopy that shows no evidence of residual carcinoid within her rectum.    Sigmoidoscopy in 1 year  Neuroendocrine blood work in 1 year    Cristina Crowley MD, MPH, FACS  Professor of Surgery, Surgical Oncology, and  Hepatobiliary Oncology  Chief, [facility] Division of Surgery Oncology  Medical Director, [facility]  Phone: [phone]  Phone: [phone]  Email: [email or web address]

## 2022-02-22 ENCOUNTER — TELEPHONE (OUTPATIENT)
Dept: UROLOGY | Facility: CLINIC | Age: 52
End: 2022-02-22
Payer: OTHER GOVERNMENT

## 2022-02-22 NOTE — TELEPHONE ENCOUNTER
Spoke with patient assisted with sooner appointment. Scheduled with np on 3/3. Patient verbally voiced understanding.

## 2022-03-03 ENCOUNTER — OFFICE VISIT (OUTPATIENT)
Dept: UROLOGY | Facility: CLINIC | Age: 52
End: 2022-03-03
Payer: OTHER GOVERNMENT

## 2022-03-03 VITALS
DIASTOLIC BLOOD PRESSURE: 74 MMHG | WEIGHT: 218.25 LBS | HEIGHT: 65 IN | SYSTOLIC BLOOD PRESSURE: 109 MMHG | RESPIRATION RATE: 18 BRPM | HEART RATE: 93 BPM | BODY MASS INDEX: 36.36 KG/M2

## 2022-03-03 DIAGNOSIS — N20.0 NEPHROLITHIASIS: Primary | ICD-10-CM

## 2022-03-03 LAB
BILIRUB SERPL-MCNC: NORMAL MG/DL
BLOOD URINE, POC: NORMAL
CLARITY, POC UA: CLEAR
COLOR, POC UA: YELLOW
GLUCOSE UR QL STRIP: NORMAL
KETONES UR QL STRIP: NORMAL
LEUKOCYTE ESTERASE URINE, POC: NORMAL
NITRITE, POC UA: NORMAL
PH, POC UA: 5
PROTEIN, POC: NORMAL
SPECIFIC GRAVITY, POC UA: 1.02
UROBILINOGEN, POC UA: 0.2

## 2022-03-03 PROCEDURE — 99203 OFFICE O/P NEW LOW 30 MIN: CPT | Mod: S$PBB,,, | Performed by: NURSE PRACTITIONER

## 2022-03-03 PROCEDURE — 81002 URINALYSIS NONAUTO W/O SCOPE: CPT | Mod: PBBFAC,PN | Performed by: NURSE PRACTITIONER

## 2022-03-03 PROCEDURE — 99999 PR PBB SHADOW E&M-EST. PATIENT-LVL V: CPT | Mod: PBBFAC,,, | Performed by: NURSE PRACTITIONER

## 2022-03-03 PROCEDURE — 99203 PR OFFICE/OUTPT VISIT, NEW, LEVL III, 30-44 MIN: ICD-10-PCS | Mod: S$PBB,,, | Performed by: NURSE PRACTITIONER

## 2022-03-03 PROCEDURE — 99999 PR PBB SHADOW E&M-EST. PATIENT-LVL V: ICD-10-PCS | Mod: PBBFAC,,, | Performed by: NURSE PRACTITIONER

## 2022-03-03 PROCEDURE — 99215 OFFICE O/P EST HI 40 MIN: CPT | Mod: PBBFAC,PN | Performed by: NURSE PRACTITIONER

## 2022-03-03 NOTE — PROGRESS NOTES
[facility] Note  Staff: [facility]    PCP: JOANNA    Chief Complaint: Kidney stone    Subjective:        HPI: Belkys Cote is a 51 y.o. female NEW PT presents today in office for evaluation of kidney stone found on [facility] appointment.  Was told to come here for further evaluation.    Pt states today she has chronic hx of kidney stones, is being f/up at [facility] each 6 mos and/or year.  She does not understand why they wanted her to see a  provider ASAP.  Pt denies gross hematuria, dysuria at this time.    CT Abdomen/Pelvis W/ Contrast 2/2/2022-done at [facility]:  IMPRESSION:  1.  No evidence of acute abdominopelvic inflammatory process.  2.  Non-obstructive Left Renal Stone, unchanged.  3.  Colonic diverticulosis, without acute diverticulitis.    REVIEW OF SYSTEMS:  A comprehensive 10 system review was performed and is negative except as noted above in HPI    PMHx:  Past Medical History:   Diagnosis Date    Allergy     Anxiety     Rectal carcinoid tumor 01/2018     PSHx:  Past Surgical History:   Procedure Laterality Date    ankle sugery   01/2016    CHOLECYSTECTOMY  01/2001    HYSTERECTOMY  01/2000    neck sugery  01/2008     Allergies:  Chlorhexidine, Povidone-iodine, and Epinephrine    Medications: reviewed   Objective:     Vitals:    03/03/22 1004   BP: 109/74   Pulse: 93   Resp: 18     Physical Exam  Vitals reviewed.   Constitutional:       Appearance: She is well-developed.   HENT:      Head: Normocephalic and atraumatic.   Eyes:      Conjunctiva/sclera: Conjunctivae normal.      Pupils: Pupils are equal, round, and reactive to light.   Cardiovascular:      Rate and Rhythm: Normal rate and regular rhythm.      Heart sounds: Normal heart sounds.   Pulmonary:      Effort: Pulmonary effort is normal.      Breath sounds: Normal breath sounds.   Abdominal:      General: Bowel sounds are normal.      Palpations: Abdomen is soft.   Musculoskeletal:         General:  Normal range of motion.      Cervical back: Normal range of motion and neck supple.   Skin:     General: Skin is warm and dry.   Neurological:      Mental Status: She is alert and oriented to person, place, and time.      Deep Tendon Reflexes: Reflexes are normal and symmetric.   Psychiatric:         Behavior: Behavior normal.         Thought Content: Thought content normal.         Judgment: Judgment normal.     LABS REVIEW:  UA today:   Color:Clear, Yellow  Spec. Grav.  1.025  PH  5.0  Negative for leukocytes, nitrates, protein, glucose, ketones, urobili, bili, and blood.    Assessment:       1. Nephrolithiasis          Plan:     Reviewed recent imaging with pt during ov today with all questions answered.  At this time, stone sitting in the kidney, no cause for hydro or obstruction at this time.    F/u--Pt has chosen to f/[facility] for further stone rechecks at this time.    [facility]: Active    Sneha Moore, MAMADOU

## 2023-02-01 ENCOUNTER — OFFICE VISIT (OUTPATIENT)
Dept: GASTROENTEROLOGY | Facility: CLINIC | Age: 53
End: 2023-02-01
Payer: OTHER GOVERNMENT

## 2023-02-01 VITALS
BODY MASS INDEX: 37.79 KG/M2 | SYSTOLIC BLOOD PRESSURE: 110 MMHG | HEART RATE: 66 BPM | WEIGHT: 227.06 LBS | DIASTOLIC BLOOD PRESSURE: 67 MMHG

## 2023-02-01 DIAGNOSIS — R10.13 EPIGASTRIC PAIN: ICD-10-CM

## 2023-02-01 DIAGNOSIS — C7A.026 MALIGNANT CARCINOID TUMOR OF THE RECTUM: Primary | ICD-10-CM

## 2023-02-01 DIAGNOSIS — R11.0 NAUSEA: ICD-10-CM

## 2023-02-01 DIAGNOSIS — K31.84 GASTROPARESIS: ICD-10-CM

## 2023-02-01 PROCEDURE — 99999 PR PBB SHADOW E&M-EST. PATIENT-LVL III: CPT | Mod: PBBFAC,,, | Performed by: INTERNAL MEDICINE

## 2023-02-01 PROCEDURE — 99204 PR OFFICE/OUTPT VISIT, NEW, LEVL IV, 45-59 MIN: ICD-10-PCS | Mod: S$PBB,,, | Performed by: INTERNAL MEDICINE

## 2023-02-01 PROCEDURE — 99999 PR PBB SHADOW E&M-EST. PATIENT-LVL III: ICD-10-PCS | Mod: PBBFAC,,, | Performed by: INTERNAL MEDICINE

## 2023-02-01 PROCEDURE — 99204 OFFICE O/P NEW MOD 45 MIN: CPT | Mod: S$PBB,,, | Performed by: INTERNAL MEDICINE

## 2023-02-01 PROCEDURE — 99213 OFFICE O/P EST LOW 20 MIN: CPT | Mod: PBBFAC,PN | Performed by: INTERNAL MEDICINE

## 2023-02-02 NOTE — PROGRESS NOTES
Subjective:       Patient ID: Belkys Cote is a 52 y.o. female.    This patient is new to me.  Referring provider:  [facility] for nausea/gastroparesis.    Chief Complaint: Gastroparesis    Patient seen for gastroparesis, remote onset, chronic, associated with epigastric pain/fullness/bloating, with symptoms intermittent and waxing/waning, with no alleviating/exacerbating factors.  She states that she was diagnosed several years ago.  She also has a history of rectal carcinoid for which she is followed by Dr. Crowley and, per documentation, was small and well differentiated.  She states that she has kept up with her surveillance sigmoidoscopies and is due again at the end of 2023 which will sandra 5 years of surveillance for her.  No other specific complaints.  For her gastroparesis, she states that she uses reglan occasionally.  She states that she has not previously been informed on potential risks of reglan including but not limited to tardive dyskinesia.    Review of Systems   Constitutional:  Negative for chills, fatigue and fever.   HENT:  Negative for sore throat and trouble swallowing.    Respiratory:  Negative for cough, shortness of breath and wheezing.    Cardiovascular:  Negative for chest pain and palpitations.   Gastrointestinal:  Positive for abdominal distention and abdominal pain. Negative for blood in stool, nausea and vomiting.   Genitourinary:  Negative for dysuria and hematuria.   Musculoskeletal:  Negative for arthralgias and myalgias.   Integumentary:  Negative for color change and rash.   Neurological:  Negative for dizziness and headaches.   Hematological:  Negative for adenopathy.   Psychiatric/Behavioral:  Negative for confusion. The patient is not nervous/anxious.    All other systems reviewed and are negative.      Objective:      Vitals:    02/01/23 1523   BP: 110/67   BP Location: Left arm   Patient Position: Sitting   Pulse: 66   Weight: 103 kg (227 lb 1.2 oz)       Physical  Exam  Constitutional:       Appearance: She is well-developed.   HENT:      Head: Normocephalic and atraumatic.   Eyes:      General: No scleral icterus.     Pupils: Pupils are equal, round, and reactive to light.   Cardiovascular:      Rate and Rhythm: Normal rate and regular rhythm.      Heart sounds: No murmur heard.  Pulmonary:      Effort: Pulmonary effort is normal.      Breath sounds: Normal breath sounds. No wheezing.   Abdominal:      General: Bowel sounds are normal. There is no distension.      Palpations: Abdomen is soft.      Tenderness: There is no abdominal tenderness.   Musculoskeletal:         General: No tenderness.      Cervical back: Normal range of motion.   Lymphadenopathy:      Cervical: No cervical adenopathy.   Skin:     General: Skin is warm and dry.      Findings: No rash.   Neurological:      Mental Status: She is alert.         Lab Results   Component Value Date    WBC 5.94 01/04/2020    HGB 13.6 01/04/2020    HCT 43.5 01/04/2020    MCV 93 01/04/2020     01/04/2020         CMP  Sodium   Date Value Ref Range Status   01/29/2022 139 136 - 145 mmol/L Final     Potassium   Date Value Ref Range Status   01/29/2022 4.0 3.5 - 5.1 mmol/L Final     Chloride   Date Value Ref Range Status   01/29/2022 105 95 - 110 mmol/L Final     CO2   Date Value Ref Range Status   01/29/2022 27 23 - 29 mmol/L Final     Glucose   Date Value Ref Range Status   01/29/2022 79 70 - 110 mg/dL Final     BUN   Date Value Ref Range Status   01/29/2022 13 6 - 20 mg/dL Final     Creatinine   Date Value Ref Range Status   01/29/2022 0.8 0.5 - 1.4 mg/dL Final     Calcium   Date Value Ref Range Status   01/29/2022 9.7 8.7 - 10.5 mg/dL Final     Total Protein   Date Value Ref Range Status   01/29/2022 7.2 6.0 - 8.4 g/dL Final     Albumin   Date Value Ref Range Status   01/29/2022 3.9 3.5 - 5.2 g/dL Final     Total Bilirubin   Date Value Ref Range Status   01/29/2022 0.5 0.1 - 1.0 mg/dL Final     Comment:     For infants  and newborns, interpretation of results should be based  on gestational age, weight and in agreement with clinical  observations.    Premature Infant recommended reference ranges:  Up to 24 hours.............<8.0 mg/dL  Up to 48 hours............<12.0 mg/dL  3-5 days..................<15.0 mg/dL  6-29 days.................<15.0 mg/dL       Alkaline Phosphatase   Date Value Ref Range Status   01/29/2022 99 55 - 135 U/L Final     AST   Date Value Ref Range Status   01/29/2022 24 10 - 40 U/L Final     ALT   Date Value Ref Range Status   01/29/2022 17 10 - 44 U/L Final     Anion Gap   Date Value Ref Range Status   01/29/2022 7 (L) 8 - 16 mmol/L Final       Old records from Dr. Crowley reviewed and are as summarized above in the HPI.    MRI was independently visualized and reviewed by me and showed no acute GI findings.      Assessment:       Problem List Items Addressed This Visit       Malignant carcinoid tumor of the rectum - Primary     Other Visit Diagnoses       Nausea        Gastroparesis        Epigastric pain                  Plan:       Antireflux precautions including avoidance of late night eating and lying down soon after eating.     Recommend daily exercise, adequate water intake, and high fiber diet.  Recommend daily miralax (17g PO once or twice daily) with intermittently dosed dulcolax (every 2-3 days)  to facilitate bowel movements.  If no relief with this, consider adding emollient laxative (castor oil or mineral oil) +/- enema.  Recommend small frequent meals, avoidance of sedentary lifestyle, adequate hydration, and avoidance of narcotic pain medications.  Discussed potential risks of reglan with patient at length including tardive dyskinesia and recommended that patient minimize use of this as much as possible.  Sigmoidoscopy for surveillance of rectal carcinoid up to date.  Next due at the end of this year which will make 5 years of surveillance for her.  Keep follow up with Dr. Crowley.  EGD for  above.  Further recommendations to follow after above.  Communication will be sent to the referring provider regarding my assessment and plan on this patient via EPIC.

## 2023-04-06 ENCOUNTER — ANESTHESIA (OUTPATIENT)
Dept: ENDOSCOPY | Facility: HOSPITAL | Age: 53
End: 2023-04-06
Payer: OTHER GOVERNMENT

## 2023-04-06 ENCOUNTER — HOSPITAL ENCOUNTER (OUTPATIENT)
Facility: HOSPITAL | Age: 53
Discharge: HOME | End: 2023-04-06
Attending: INTERNAL MEDICINE | Admitting: INTERNAL MEDICINE
Payer: OTHER GOVERNMENT

## 2023-04-06 ENCOUNTER — ANESTHESIA EVENT (OUTPATIENT)
Dept: ENDOSCOPY | Facility: HOSPITAL | Age: 53
End: 2023-04-06
Payer: OTHER GOVERNMENT

## 2023-04-06 DIAGNOSIS — K44.9 HIATAL HERNIA: ICD-10-CM

## 2023-04-06 DIAGNOSIS — K29.70 GASTRITIS, PRESENCE OF BLEEDING UNSPECIFIED, UNSPECIFIED CHRONICITY, UNSPECIFIED GASTRITIS TYPE: Primary | ICD-10-CM

## 2023-04-06 DIAGNOSIS — R10.13 EPIGASTRIC PAIN: ICD-10-CM

## 2023-04-06 PROCEDURE — 63600175 PHARM REV CODE 636 W HCPCS

## 2023-04-06 PROCEDURE — 88305 TISSUE EXAM BY PATHOLOGIST: ICD-10-PCS | Mod: 26,,, | Performed by: PATHOLOGY

## 2023-04-06 PROCEDURE — 43239 PR EGD, FLEX, W/BIOPSY, SGL/MULTI: ICD-10-PCS | Mod: ,,, | Performed by: INTERNAL MEDICINE

## 2023-04-06 PROCEDURE — D9220A PRA ANESTHESIA: Mod: ANES,,, | Performed by: ANESTHESIOLOGY

## 2023-04-06 PROCEDURE — D9220A PRA ANESTHESIA: ICD-10-PCS | Mod: ANES,,, | Performed by: ANESTHESIOLOGY

## 2023-04-06 PROCEDURE — 63600175 PHARM REV CODE 636 W HCPCS: Performed by: NURSE ANESTHETIST, CERTIFIED REGISTERED

## 2023-04-06 PROCEDURE — D9220A PRA ANESTHESIA: Mod: CRNA,,, | Performed by: NURSE ANESTHETIST, CERTIFIED REGISTERED

## 2023-04-06 PROCEDURE — 37000008 HC ANESTHESIA 1ST 15 MINUTES: Performed by: INTERNAL MEDICINE

## 2023-04-06 PROCEDURE — D9220A PRA ANESTHESIA: ICD-10-PCS | Mod: CRNA,,, | Performed by: NURSE ANESTHETIST, CERTIFIED REGISTERED

## 2023-04-06 PROCEDURE — 88305 TISSUE EXAM BY PATHOLOGIST: CPT | Performed by: PATHOLOGY

## 2023-04-06 PROCEDURE — 37000009 HC ANESTHESIA EA ADD 15 MINS: Performed by: INTERNAL MEDICINE

## 2023-04-06 PROCEDURE — 43239 EGD BIOPSY SINGLE/MULTIPLE: CPT | Performed by: INTERNAL MEDICINE

## 2023-04-06 PROCEDURE — 25000003 PHARM REV CODE 250: Performed by: INTERNAL MEDICINE

## 2023-04-06 PROCEDURE — 27201012 HC FORCEPS, HOT/COLD, DISP: Performed by: INTERNAL MEDICINE

## 2023-04-06 PROCEDURE — 00731 ANES UPR GI NDSC PX NOS: CPT | Performed by: INTERNAL MEDICINE

## 2023-04-06 PROCEDURE — 88305 TISSUE EXAM BY PATHOLOGIST: CPT | Mod: 26,,, | Performed by: PATHOLOGY

## 2023-04-06 PROCEDURE — 43239 EGD BIOPSY SINGLE/MULTIPLE: CPT | Mod: ,,, | Performed by: INTERNAL MEDICINE

## 2023-04-06 PROCEDURE — 63600175 PHARM REV CODE 636 W HCPCS: Performed by: ANESTHESIOLOGY

## 2023-04-06 PROCEDURE — 25000003 PHARM REV CODE 250: Performed by: NURSE ANESTHETIST, CERTIFIED REGISTERED

## 2023-04-06 RX ORDER — SODIUM CHLORIDE 9 MG/ML
INJECTION, SOLUTION INTRAVENOUS CONTINUOUS
Status: DISCONTINUED | OUTPATIENT
Start: 2023-04-06 | End: 2023-04-06 | Stop reason: HOSPADM

## 2023-04-06 RX ORDER — MIDAZOLAM HYDROCHLORIDE 1 MG/ML
INJECTION INTRAMUSCULAR; INTRAVENOUS
Status: COMPLETED
Start: 2023-04-06 | End: 2023-04-06

## 2023-04-06 RX ORDER — LIDOCAINE HYDROCHLORIDE 20 MG/ML
INJECTION INTRAVENOUS
Status: DISCONTINUED | OUTPATIENT
Start: 2023-04-06 | End: 2023-04-06

## 2023-04-06 RX ORDER — PROPOFOL 10 MG/ML
VIAL (ML) INTRAVENOUS
Status: DISCONTINUED | OUTPATIENT
Start: 2023-04-06 | End: 2023-04-06

## 2023-04-06 RX ORDER — PANTOPRAZOLE SODIUM 40 MG/1
40 TABLET, DELAYED RELEASE ORAL DAILY
Qty: 90 TABLET | Refills: 3 | Status: SHIPPED | OUTPATIENT
Start: 2023-04-06 | End: 2024-04-05

## 2023-04-06 RX ORDER — ONDANSETRON 2 MG/ML
4 INJECTION INTRAMUSCULAR; INTRAVENOUS ONCE
Status: COMPLETED | OUTPATIENT
Start: 2023-04-06 | End: 2023-04-06

## 2023-04-06 RX ORDER — MIDAZOLAM HYDROCHLORIDE 1 MG/ML
2 INJECTION INTRAMUSCULAR; INTRAVENOUS ONCE
Status: COMPLETED | OUTPATIENT
Start: 2023-04-06 | End: 2023-04-06

## 2023-04-06 RX ADMIN — PROPOFOL 40 MG: 10 INJECTION, EMULSION INTRAVENOUS at 01:04

## 2023-04-06 RX ADMIN — PROPOFOL 100 MG: 10 INJECTION, EMULSION INTRAVENOUS at 01:04

## 2023-04-06 RX ADMIN — SODIUM CHLORIDE: 9 INJECTION, SOLUTION INTRAVENOUS at 12:04

## 2023-04-06 RX ADMIN — LIDOCAINE HYDROCHLORIDE 100 MG: 20 INJECTION, SOLUTION INTRAVENOUS at 01:04

## 2023-04-06 RX ADMIN — ONDANSETRON 4 MG: 2 INJECTION INTRAMUSCULAR; INTRAVENOUS at 12:04

## 2023-04-06 RX ADMIN — MIDAZOLAM HYDROCHLORIDE 2 MG: 1 INJECTION INTRAMUSCULAR; INTRAVENOUS at 12:04

## 2023-04-06 RX ADMIN — MIDAZOLAM 2 MG: 1 INJECTION INTRAMUSCULAR; INTRAVENOUS at 12:04

## 2023-04-06 NOTE — PLAN OF CARE
Vss, tisha po fluids, denies pain, ambulates easily. IV removed, catheter intact. Discharge instructions provided and states understanding. States ready to go home.  Discharged from facility with family per wheelchair.

## 2023-04-06 NOTE — ANESTHESIA POSTPROCEDURE EVALUATION
Anesthesia Post Evaluation    Patient: Belkys Cote    Procedure(s) Performed: Procedure(s) (LRB):  EGD (ESOPHAGOGASTRODUODENOSCOPY) (N/A)    Final Anesthesia Type: general      Patient location during evaluation: PACU  Patient participation: Yes- Able to Participate  Level of consciousness: awake and alert  Post-procedure vital signs: reviewed and stable  Pain management: adequate  Airway patency: patent    PONV status at discharge: No PONV  Anesthetic complications: no      Cardiovascular status: hemodynamically stable  Respiratory status: unassisted and room air  Hydration status: euvolemic  Follow-up not needed.          Vitals Value Taken Time   /63 04/06/23 1350   Temp 36.6 °C (97.9 °F) 04/06/23 1350   Pulse 84 04/06/23 1350   Resp 16 04/06/23 1350   SpO2 95 % 04/06/23 1350         Event Time   Out of Recovery 14:08:55         Pain/Antonio Score: Antonio Score: 10 (4/6/2023  1:50 PM)

## 2023-04-06 NOTE — ANESTHESIA PREPROCEDURE EVALUATION
04/06/2023  Belkys Cote is a 52 y.o., female.      Pre-op Assessment    I have reviewed the NPO Status.   I have reviewed the Medications.     Review of Systems  Anesthesia Hx:  Hx of anxiety and nausea with other EGDs   Social:  Non-Smoker    Cardiovascular:   Exercise tolerance: good    Pulmonary:  Pulmonary Normal    Hepatic/GI:   Hx rectal neuroendocrine tumor   Neurological:  Neurology Normal    Endocrine:  Endocrine Normal        Physical Exam  General: Well nourished        Anesthesia Plan  Type of Anesthesia, risks & benefits discussed:    Anesthesia Type: Gen Natural Airway  Intra-op Monitoring Plan: Standard ASA Monitors  Induction:  IV  Informed Consent: Informed consent signed with the Patient and all parties understand the risks and agree with anesthesia plan.  All questions answered.   ASA Score: 3    Ready For Surgery From Anesthesia Perspective.     .

## 2023-04-06 NOTE — H&P
CC: Epigastric pain, nausea    52 year old female with above. States that symptoms are ongoing, no alleviating/exacerbating factors. No family history of colorectal CA. . No bleeding or weight loss.     ROS:  No headache, no fever/chills, no chest pain/SOB, no nausea/vomiting/diarrhea/constipation/GI bleeding/abdominal pain, no dysuria/hematuria.    VSSAF   Exam:   Alert and oriented x 3; no apparent distress   PERRLA, sclera anicteric  CV: Regular rate/rhythm, normal PMI   Lungs: Clear bilaterally with no wheeze/rales   Abdomen: Soft, NT/ND, normal bowel sounds   Ext: No cyanosis, clubbing     Impression:   As above    Plan:   Proceed with endoscopy. Further recs to follow.

## 2023-04-06 NOTE — PROVATION PATIENT INSTRUCTIONS
Discharge Summary/Instructions after an Endoscopic Procedure  Patient Name: Belkys Cote  Patient MRN: [id number]  Patient YOB: 1970 Thursday, April 6, 2023  Tray Man MD  Dear patient,  As a result of recent federal legislation (The Federal Cures Act), you may   receive lab or pathology results from your procedure in your [facility]   account before your physician is able to contact you. Your physician or   their representative will relay the results to you with their   recommendations at their soonest availability.  Thank you,  RESTRICTIONS:  During your procedure today, you received medications for sedation.  These   medications may affect your judgment, balance and coordination.  Therefore,   for 24 hours, you have the following restrictions:   - DO NOT drive a car, operate machinery, make legal/financial decisions,   sign important papers or drink alcohol.    ACTIVITY:  Today: no heavy lifting, straining or running due to procedural   sedation/anesthesia.  The following day: return to full activity including work.  DIET:  Eat and drink normally unless instructed otherwise.     TREATMENT FOR COMMON SIDE EFFECTS:  - Mild abdominal pain, nausea, belching, bloating or excessive gas:  rest,   eat lightly and use a heating pad.  - Sore Throat: treat with throat lozenges and/or gargle with warm salt   water.  - Because air was used during the procedure, expelling large amounts of air   from your rectum or belching is normal.  - If a bowel prep was taken, you may not have a bowel movement for 1-3 days.    This is normal.  SYMPTOMS TO WATCH FOR AND REPORT TO YOUR PHYSICIAN:  1. Abdominal pain or bloating, other than gas cramps.  2. Chest pain.  3. Back pain.  4. Signs of infection such as: chills or fever occurring within 24 hours   after the procedure.  5. Rectal bleeding, which would show as bright red, maroon, or black stools.   (A tablespoon of blood from the rectum is not serious, especially  if   hemorrhoids are present.)  6. Vomiting.  7. Weakness or dizziness.  GO DIRECTLY TO THE NEAREST EMERGENCY ROOM IF YOU HAVE ANY OF THE FOLLOWING:      Difficulty breathing              Chills and/or fever over 101 F   Persistent vomiting and/or vomiting blood   Severe abdominal pain   Severe chest pain   Black, tarry stools   Bleeding- more than one tablespoon   Any other symptom or condition that you feel may need urgent attention  Your doctor recommends these additional instructions:  If any biopsies were taken, your doctors clinic will contact you in 1 to 2   weeks with any results.  - Patient has a contact number available for emergencies.  The signs and   symptoms of potential delayed complications were discussed with the   patient.  Return to normal activities tomorrow.  Written discharge   instructions were provided to the patient.   - Resume previous diet.   - Continue present medications.   - No aspirin, ibuprofen, naproxen, or other non-steroidal anti-inflammatory   drugs.   - Await pathology results.   - Discharge patient to home (ambulatory).   - Follow an antireflux regimen.   - Use Protonix (pantoprazole) 40 mg PO daily.   - Return to GI office after studies are complete.  For questions, problems or results please call your physician - Tray Man MD at Work:  [phone].  [facility], EMERGENCY ROOM PHONE NUMBER: [phone]  IF A COMPLICATION OR EMERGENCY SITUATION ARISES AND YOU ARE UNABLE TO REACH   YOUR PHYSICIAN - GO DIRECTLY TO THE EMERGENCY ROOM.  Tray Man MD  4/6/2023 1:25:15 PM  This report has been verified and signed electronically.  Dear patient,  As a result of recent federal legislation (The Federal Cures Act), you may   receive lab or pathology results from your procedure in your [facility]   account before your physician is able to contact you. Your physician or   their representative will relay the results to you with their   recommendations at their  soonest availability.  Thank you,  [employer]ATION

## 2023-04-06 NOTE — TRANSFER OF CARE
"Anesthesia Transfer of Care Note    Patient: Belkys Cote    Procedure(s) Performed: Procedure(s) (LRB):  EGD (ESOPHAGOGASTRODUODENOSCOPY) (N/A)    Patient location: PACU    Anesthesia Type: general    Transport from OR: Transported from OR on room air with adequate spontaneous ventilation    Post pain: adequate analgesia    Post assessment: no apparent anesthetic complications    Post vital signs: stable    Level of consciousness: awake    Nausea/Vomiting: no nausea/vomiting    Complications: none    Transfer of care protocol was followed      Last vitals:   Visit Vitals  /80 (BP Location: Left arm, Patient Position: Lying)   Pulse 73   Temp 36.6 °C (97.9 °F) (Skin)   Resp 18   Ht 5' 5" (1.651 m)   Wt 103 kg (227 lb)   SpO2 96%   Breastfeeding No   BMI 37.77 kg/m²     "

## 2023-04-10 VITALS
OXYGEN SATURATION: 95 % | SYSTOLIC BLOOD PRESSURE: 113 MMHG | HEIGHT: 65 IN | HEART RATE: 84 BPM | TEMPERATURE: 98 F | DIASTOLIC BLOOD PRESSURE: 63 MMHG | BODY MASS INDEX: 37.82 KG/M2 | RESPIRATION RATE: 16 BRPM | WEIGHT: 227 LBS

## 2023-04-17 LAB
FINAL PATHOLOGIC DIAGNOSIS: NORMAL
GROSS: NORMAL
Lab: NORMAL

## 2025-07-18 DIAGNOSIS — Z01.89 ENCOUNTER FOR OTHER SPECIFIED SPECIAL EXAMINATIONS: Primary | ICD-10-CM

## 2025-08-07 ENCOUNTER — HOSPITAL ENCOUNTER (OUTPATIENT)
Dept: RADIOLOGY | Facility: HOSPITAL | Age: 55
Discharge: HOME | End: 2025-08-07
Attending: PHYSICIAN ASSISTANT
Payer: OTHER GOVERNMENT

## 2025-08-07 DIAGNOSIS — Z01.89 ENCOUNTER FOR OTHER SPECIFIED SPECIAL EXAMINATIONS: ICD-10-CM

## 2025-08-07 PROCEDURE — 76642 ULTRASOUND BREAST LIMITED: CPT | Mod: TC,PO,RT

## 2025-08-07 PROCEDURE — 76642 ULTRASOUND BREAST LIMITED: CPT | Mod: 26,RT,, | Performed by: RADIOLOGY
